# Patient Record
Sex: FEMALE | Race: WHITE | NOT HISPANIC OR LATINO | Employment: UNEMPLOYED | URBAN - METROPOLITAN AREA
[De-identification: names, ages, dates, MRNs, and addresses within clinical notes are randomized per-mention and may not be internally consistent; named-entity substitution may affect disease eponyms.]

---

## 2017-01-17 LAB
EXTERNAL ABO GROUPING: NORMAL
EXTERNAL HEPATITIS B SURFACE ANTIGEN: NEGATIVE
EXTERNAL RH FACTOR: POSITIVE
EXTERNAL RUBELLA QUALITATIVE: NORMAL
EXTERNAL SYPHILIS RPR SCREEN: NORMAL
EXTERNAL URINE DRUG SCREEN: NORMAL
HIV1 AB SPEC QL IA.RAPID: NEGATIVE

## 2017-05-16 LAB — EXTERNAL GTT 1 HOUR: 86

## 2017-07-25 ENCOUNTER — LAB REQUISITION (OUTPATIENT)
Dept: LAB | Facility: HOSPITAL | Age: 32
End: 2017-07-25

## 2017-07-25 DIAGNOSIS — Z34.83 ENCOUNTER FOR SUPERVISION OF OTHER NORMAL PREGNANCY, THIRD TRIMESTER: ICD-10-CM

## 2017-07-25 LAB — EXTERNAL GROUP B STREP ANTIGEN: NORMAL

## 2017-07-25 PROCEDURE — 87081 CULTURE SCREEN ONLY: CPT | Performed by: OBSTETRICS & GYNECOLOGY

## 2017-07-28 LAB — BACTERIA SPEC AEROBE CULT: NORMAL

## 2017-08-13 ENCOUNTER — ANESTHESIA (OUTPATIENT)
Dept: LABOR AND DELIVERY | Facility: HOSPITAL | Age: 32
End: 2017-08-13

## 2017-08-13 ENCOUNTER — ANESTHESIA EVENT (OUTPATIENT)
Dept: LABOR AND DELIVERY | Facility: HOSPITAL | Age: 32
End: 2017-08-13

## 2017-08-13 ENCOUNTER — HOSPITAL ENCOUNTER (INPATIENT)
Facility: HOSPITAL | Age: 32
LOS: 2 days | Discharge: HOME | End: 2017-08-15
Attending: OBSTETRICS & GYNECOLOGY | Admitting: OBSTETRICS & GYNECOLOGY

## 2017-08-13 LAB
ABO GROUP BLD: NORMAL
ABO GROUP BLD: NORMAL
BLD GP AB SCN SERPL QL: NEGATIVE
BLD GP AB SCN SERPL QL: NEGATIVE
DEPRECATED RDW RBC AUTO: 44.7 FL (ref 37–54)
ERYTHROCYTE [DISTWIDTH] IN BLOOD BY AUTOMATED COUNT: 12.9 % (ref 11.3–14.5)
HCT VFR BLD AUTO: 42.2 % (ref 34.5–44)
HGB BLD-MCNC: 14.8 G/DL (ref 11.5–15.5)
MCH RBC QN AUTO: 33.4 PG (ref 27–31)
MCHC RBC AUTO-ENTMCNC: 35.1 G/DL (ref 32–36)
MCV RBC AUTO: 95.3 FL (ref 80–99)
PLATELET # BLD AUTO: 205 10*3/MM3 (ref 150–450)
PMV BLD AUTO: 11.4 FL (ref 6–12)
RBC # BLD AUTO: 4.43 10*6/MM3 (ref 3.89–5.14)
RH BLD: POSITIVE
RH BLD: POSITIVE
WBC NRBC COR # BLD: 13.55 10*3/MM3 (ref 3.5–10.8)

## 2017-08-13 PROCEDURE — 0KQM0ZZ REPAIR PERINEUM MUSCLE, OPEN APPROACH: ICD-10-PCS | Performed by: OBSTETRICS & GYNECOLOGY

## 2017-08-13 PROCEDURE — 25010000002 ROPIVACAINE PER 1 MG: Performed by: ANESTHESIOLOGY

## 2017-08-13 PROCEDURE — 25010000002 FENTANYL CITRATE (PF) 100 MCG/2ML SOLUTION: Performed by: ANESTHESIOLOGY

## 2017-08-13 PROCEDURE — 85027 COMPLETE CBC AUTOMATED: CPT | Performed by: OBSTETRICS & GYNECOLOGY

## 2017-08-13 PROCEDURE — 86900 BLOOD TYPING SEROLOGIC ABO: CPT | Performed by: OBSTETRICS & GYNECOLOGY

## 2017-08-13 PROCEDURE — 86850 RBC ANTIBODY SCREEN: CPT | Performed by: OBSTETRICS & GYNECOLOGY

## 2017-08-13 PROCEDURE — 86901 BLOOD TYPING SEROLOGIC RH(D): CPT | Performed by: OBSTETRICS & GYNECOLOGY

## 2017-08-13 PROCEDURE — C1755 CATHETER, INTRASPINAL: HCPCS | Performed by: ANESTHESIOLOGY

## 2017-08-13 RX ORDER — ONDANSETRON 2 MG/ML
4 INJECTION INTRAMUSCULAR; INTRAVENOUS EVERY 6 HOURS PRN
Status: DISCONTINUED | OUTPATIENT
Start: 2017-08-13 | End: 2017-08-13 | Stop reason: HOSPADM

## 2017-08-13 RX ORDER — PROMETHAZINE HYDROCHLORIDE 12.5 MG/1
12.5 TABLET ORAL EVERY 6 HOURS PRN
Status: DISCONTINUED | OUTPATIENT
Start: 2017-08-13 | End: 2017-08-13 | Stop reason: HOSPADM

## 2017-08-13 RX ORDER — SODIUM CHLORIDE 0.9 % (FLUSH) 0.9 %
1-10 SYRINGE (ML) INJECTION AS NEEDED
Status: DISCONTINUED | OUTPATIENT
Start: 2017-08-13 | End: 2017-08-13 | Stop reason: HOSPADM

## 2017-08-13 RX ORDER — METOCLOPRAMIDE HYDROCHLORIDE 5 MG/ML
10 INJECTION INTRAMUSCULAR; INTRAVENOUS ONCE AS NEEDED
Status: DISCONTINUED | OUTPATIENT
Start: 2017-08-13 | End: 2017-08-13 | Stop reason: HOSPADM

## 2017-08-13 RX ORDER — DIPHENHYDRAMINE HCL 25 MG
25 CAPSULE ORAL NIGHTLY PRN
Status: DISCONTINUED | OUTPATIENT
Start: 2017-08-13 | End: 2017-08-13 | Stop reason: HOSPADM

## 2017-08-13 RX ORDER — IBUPROFEN 600 MG/1
600 TABLET ORAL EVERY 6 HOURS PRN
Status: DISCONTINUED | OUTPATIENT
Start: 2017-08-13 | End: 2017-08-13 | Stop reason: HOSPADM

## 2017-08-13 RX ORDER — PRENATAL WITH FERROUS FUM AND FOLIC ACID 3080; 920; 120; 400; 22; 1.84; 3; 20; 10; 1; 12; 200; 27; 25; 2 [IU]/1; [IU]/1; MG/1; [IU]/1; MG/1; MG/1; MG/1; MG/1; MG/1; MG/1; UG/1; MG/1; MG/1; MG/1; MG/1
1 TABLET ORAL DAILY
COMMUNITY
End: 2020-01-31 | Stop reason: HOSPADM

## 2017-08-13 RX ORDER — ACETAMINOPHEN 325 MG/1
650 TABLET ORAL EVERY 4 HOURS PRN
Status: DISCONTINUED | OUTPATIENT
Start: 2017-08-13 | End: 2017-08-13 | Stop reason: HOSPADM

## 2017-08-13 RX ORDER — TRISODIUM CITRATE DIHYDRATE AND CITRIC ACID MONOHYDRATE 500; 334 MG/5ML; MG/5ML
30 SOLUTION ORAL ONCE
Status: DISCONTINUED | OUTPATIENT
Start: 2017-08-13 | End: 2017-08-13 | Stop reason: HOSPADM

## 2017-08-13 RX ORDER — ZOLPIDEM TARTRATE 5 MG/1
5 TABLET ORAL NIGHTLY PRN
Status: DISCONTINUED | OUTPATIENT
Start: 2017-08-13 | End: 2017-08-15 | Stop reason: HOSPADM

## 2017-08-13 RX ORDER — IBUPROFEN 600 MG/1
600 TABLET ORAL EVERY 6 HOURS PRN
Status: DISCONTINUED | OUTPATIENT
Start: 2017-08-13 | End: 2017-08-15 | Stop reason: HOSPADM

## 2017-08-13 RX ORDER — OXYCODONE HYDROCHLORIDE AND ACETAMINOPHEN 5; 325 MG/1; MG/1
1 TABLET ORAL EVERY 4 HOURS PRN
Status: DISCONTINUED | OUTPATIENT
Start: 2017-08-13 | End: 2017-08-13 | Stop reason: HOSPADM

## 2017-08-13 RX ORDER — ZOLPIDEM TARTRATE 5 MG/1
5 TABLET ORAL NIGHTLY PRN
Status: DISCONTINUED | OUTPATIENT
Start: 2017-08-13 | End: 2017-08-13 | Stop reason: HOSPADM

## 2017-08-13 RX ORDER — PROMETHAZINE HYDROCHLORIDE 12.5 MG/1
12.5 SUPPOSITORY RECTAL EVERY 6 HOURS PRN
Status: DISCONTINUED | OUTPATIENT
Start: 2017-08-13 | End: 2017-08-13 | Stop reason: HOSPADM

## 2017-08-13 RX ORDER — PROMETHAZINE HYDROCHLORIDE 25 MG/ML
12.5 INJECTION, SOLUTION INTRAMUSCULAR; INTRAVENOUS EVERY 6 HOURS PRN
Status: DISCONTINUED | OUTPATIENT
Start: 2017-08-13 | End: 2017-08-13 | Stop reason: HOSPADM

## 2017-08-13 RX ORDER — DIPHENHYDRAMINE HYDROCHLORIDE 50 MG/ML
12.5 INJECTION INTRAMUSCULAR; INTRAVENOUS EVERY 8 HOURS PRN
Status: DISCONTINUED | OUTPATIENT
Start: 2017-08-13 | End: 2017-08-13 | Stop reason: HOSPADM

## 2017-08-13 RX ORDER — SODIUM CHLORIDE, SODIUM LACTATE, POTASSIUM CHLORIDE, CALCIUM CHLORIDE 600; 310; 30; 20 MG/100ML; MG/100ML; MG/100ML; MG/100ML
125 INJECTION, SOLUTION INTRAVENOUS CONTINUOUS
Status: DISCONTINUED | OUTPATIENT
Start: 2017-08-13 | End: 2017-08-13

## 2017-08-13 RX ORDER — OXYCODONE HYDROCHLORIDE AND ACETAMINOPHEN 5; 325 MG/1; MG/1
1 TABLET ORAL EVERY 4 HOURS PRN
Status: DISCONTINUED | OUTPATIENT
Start: 2017-08-13 | End: 2017-08-15 | Stop reason: HOSPADM

## 2017-08-13 RX ORDER — DOCUSATE SODIUM 100 MG/1
100 CAPSULE, LIQUID FILLED ORAL 2 TIMES DAILY
Status: DISCONTINUED | OUTPATIENT
Start: 2017-08-13 | End: 2017-08-15 | Stop reason: HOSPADM

## 2017-08-13 RX ORDER — DIPHENHYDRAMINE HYDROCHLORIDE 50 MG/ML
25 INJECTION INTRAMUSCULAR; INTRAVENOUS NIGHTLY PRN
Status: DISCONTINUED | OUTPATIENT
Start: 2017-08-13 | End: 2017-08-13 | Stop reason: HOSPADM

## 2017-08-13 RX ORDER — BISACODYL 10 MG
10 SUPPOSITORY, RECTAL RECTAL DAILY PRN
Status: DISCONTINUED | OUTPATIENT
Start: 2017-08-14 | End: 2017-08-15 | Stop reason: HOSPADM

## 2017-08-13 RX ORDER — LANOLIN 100 %
OINTMENT (GRAM) TOPICAL AS NEEDED
Status: DISCONTINUED | OUTPATIENT
Start: 2017-08-13 | End: 2017-08-15 | Stop reason: HOSPADM

## 2017-08-13 RX ORDER — FENTANYL CITRATE 50 UG/ML
INJECTION, SOLUTION INTRAMUSCULAR; INTRAVENOUS AS NEEDED
Status: DISCONTINUED | OUTPATIENT
Start: 2017-08-13 | End: 2017-08-13 | Stop reason: SURG

## 2017-08-13 RX ORDER — OXYCODONE HYDROCHLORIDE AND ACETAMINOPHEN 5; 325 MG/1; MG/1
2 TABLET ORAL EVERY 4 HOURS PRN
Status: DISCONTINUED | OUTPATIENT
Start: 2017-08-13 | End: 2017-08-13 | Stop reason: HOSPADM

## 2017-08-13 RX ORDER — MORPHINE SULFATE 4 MG/ML
2 INJECTION, SOLUTION INTRAMUSCULAR; INTRAVENOUS
Status: DISCONTINUED | OUTPATIENT
Start: 2017-08-13 | End: 2017-08-13 | Stop reason: HOSPADM

## 2017-08-13 RX ORDER — ROPIVACAINE HYDROCHLORIDE 5 MG/ML
INJECTION, SOLUTION EPIDURAL; INFILTRATION; PERINEURAL AS NEEDED
Status: DISCONTINUED | OUTPATIENT
Start: 2017-08-13 | End: 2017-08-13 | Stop reason: SURG

## 2017-08-13 RX ORDER — LIDOCAINE HYDROCHLORIDE 10 MG/ML
5 INJECTION, SOLUTION INFILTRATION; PERINEURAL AS NEEDED
Status: DISCONTINUED | OUTPATIENT
Start: 2017-08-13 | End: 2017-08-13 | Stop reason: HOSPADM

## 2017-08-13 RX ORDER — EPHEDRINE SULFATE/0.9% NACL/PF 50 MG/10ML
5 SYRINGE (ML) INTRAVENOUS
Status: DISCONTINUED | OUTPATIENT
Start: 2017-08-13 | End: 2017-08-13 | Stop reason: HOSPADM

## 2017-08-13 RX ORDER — FAMOTIDINE 10 MG/ML
20 INJECTION, SOLUTION INTRAVENOUS ONCE AS NEEDED
Status: DISCONTINUED | OUTPATIENT
Start: 2017-08-13 | End: 2017-08-13 | Stop reason: HOSPADM

## 2017-08-13 RX ORDER — OXYTOCIN/RINGER'S LACTATE 20/1000 ML
999 PLASTIC BAG, INJECTION (ML) INTRAVENOUS ONCE
Status: COMPLETED | OUTPATIENT
Start: 2017-08-13 | End: 2017-08-13

## 2017-08-13 RX ORDER — OXYTOCIN/RINGER'S LACTATE 20/1000 ML
125 PLASTIC BAG, INJECTION (ML) INTRAVENOUS ONCE
Status: COMPLETED | OUTPATIENT
Start: 2017-08-13 | End: 2017-08-13

## 2017-08-13 RX ORDER — PROMETHAZINE HYDROCHLORIDE 25 MG/1
25 TABLET ORAL EVERY 6 HOURS PRN
Status: DISCONTINUED | OUTPATIENT
Start: 2017-08-13 | End: 2017-08-15 | Stop reason: HOSPADM

## 2017-08-13 RX ADMIN — SODIUM CHLORIDE, POTASSIUM CHLORIDE, SODIUM LACTATE AND CALCIUM CHLORIDE 1000 ML: 600; 310; 30; 20 INJECTION, SOLUTION INTRAVENOUS at 05:30

## 2017-08-13 RX ADMIN — Medication 125 ML/HR: at 13:16

## 2017-08-13 RX ADMIN — ROPIVACAINE HYDROCHLORIDE 10 ML: 5 INJECTION, SOLUTION EPIDURAL; INFILTRATION; PERINEURAL at 06:00

## 2017-08-13 RX ADMIN — ROPIVACAINE HYDROCHLORIDE 16 ML/HR: 5 INJECTION, SOLUTION EPIDURAL; INFILTRATION; PERINEURAL at 06:00

## 2017-08-13 RX ADMIN — DOCUSATE SODIUM 100 MG: 100 CAPSULE, LIQUID FILLED ORAL at 16:26

## 2017-08-13 RX ADMIN — SODIUM CHLORIDE, POTASSIUM CHLORIDE, SODIUM LACTATE AND CALCIUM CHLORIDE 125 ML/HR: 600; 310; 30; 20 INJECTION, SOLUTION INTRAVENOUS at 07:27

## 2017-08-13 RX ADMIN — OXYCODONE AND ACETAMINOPHEN 1 TABLET: 5; 325 TABLET ORAL at 20:07

## 2017-08-13 RX ADMIN — Medication 999 ML/HR: at 12:15

## 2017-08-13 RX ADMIN — IBUPROFEN 600 MG: 600 TABLET ORAL at 16:26

## 2017-08-13 RX ADMIN — FENTANYL CITRATE 100 MCG: 50 INJECTION, SOLUTION INTRAMUSCULAR; INTRAVENOUS at 06:00

## 2017-08-13 NOTE — L&D DELIVERY NOTE
2017    Patient:Luzma Parks    MR#:[id number]    Vaginal Delivery Note  32 y.o. yo female  at 39w1d    Patient Active Problem List   Diagnosis   • Labor without complication       Delivery     Delivery: Vaginal, Spontaneous Delivery     YOB: 2016    Time of Birth: 12:08 PM      Anesthesia: Epidural     Delivering clinician: Claudette Pool    Forceps?   No   Vacuum? No    Shoulder dystocia present: No          Infant    Findings: male  infant     Infant observations: Weight: 7 lb 15.7 oz (3.62 kg)     Observations/Comments:         Apgars: 8   @ 1 minute /    9   @ 5 minutes         Placenta, Cord, and Fluid    Amnionic Fluid: clear   Placenta delivered  Spontaneous  at        Cord: 3 vessels  present.  Uterus explored and empty   Nuchal Cord?  no   Cord blood obtained: Yes    Cord gases obtained:  No    Cord gas results: Pending         Repair    Episiotomy: No   Lacerations: Yes  Laceration Information  Laceration Repaired?   Perineal: 2nd  Yes    Periurethral:         Labial: right  Yes    Sulcus: left  Yes    Vaginal: Yes  Yes    Cervical: No             Estimated Blood Loss:    400  mls.   Suture used for repair: 3-0 Vicryl and 2-0 Vicryl.   Rectal mucosa and sphincter intact and reinforced.  Hematoma forming in left sulcus secured and stable.     Complications  none    Disposition  Mother to Mother Baby/Postpartum  in stable condition currently.  Baby to remains with mom  in stable condition currently.                Claudette Pool MD  17  12:46 PM

## 2017-08-13 NOTE — PLAN OF CARE
Problem: Patient Care Overview (Adult)  Goal: Adult Individualization and Mutuality  Outcome: Ongoing (interventions implemented as appropriate)  Goal: Discharge Needs Assessment  Outcome: Ongoing (interventions implemented as appropriate)    08/13/17 0812   Discharge Needs Assessment   Concerns To Be Addressed no discharge needs identified         Problem: Labor (Cervical Ripen, Induct, Augment) (Adult,Obstetrics,Pediatric)  Goal: Signs and Symptoms of Listed Potential Problems Will be Absent or Manageable (Labor)  Outcome: Outcome(s) achieved Date Met:  08/13/17 08/13/17 0812   Labor (Cervical Ripen, Induct, Augment)   Problems Assessed (Labor) all   Problems Present (Labor) none

## 2017-08-13 NOTE — ANESTHESIA PROCEDURE NOTES
Labor Epidural    Patient location during procedure: OB  Start Time: 8/13/2017 5:52 AM  Performed By  Anesthesiologist: SEVEN FAROOQ  Preanesthetic Checklist  Completed: patient identified, site marked, surgical consent, pre-op evaluation, timeout performed, risks and benefits discussed and monitors and equipment checked  Prep:  Pt Position:sitting  Sterile Tech:cap, gloves, mask and sterile barrier  Prep:alcohol swabs  Monitoring:blood pressure monitoring  Epidural Block Procedure:  Approach:midline  Guidance:landmark technique  Location:L3-L4  Needle Type:Tuohy  Needle Gauge:17 G  Loss of Resistance Medium: air  Loss of Resistance: 5cm  Cath Depth at skin:10 cm  Paresthesia: none  Aspiration:negative  Test Dose:negative  Number of Attempts: 1  Post Assessment:  Dressing:occlusive dressing applied and secured with tape  Pt Tolerance:patient tolerated the procedure well with no apparent complications  Complications:no

## 2017-08-13 NOTE — H&P
"History and Physical  [facility]    Chief Complaint   Patient presents with   • rule out labor       Patient Active Problem List   Diagnosis   • Labor without complication       Luzma Parks is a 32 y.o. year old  with an Estimated Date of Delivery: 17 currently at 39w1d presenting with labor.    Prenatal care has been with Dr. Pool.  It has been significant for uncomplicated.      The following portions of the patient's history were reviewed and updated as appropriate:vital signs, allergies, current medications, past medical history, past social history, past surgical history and problem list.    Review of Systems  Pertinent items are noted in HPI.     Objective     /68 (BP Location: Left arm, Patient Position: Lying)  Pulse 67  Temp 98 °F (36.7 °C) (Oral)   Resp 16  Ht 65\" (165.1 cm)  Wt 170 lb (77.1 kg)  BMI 28.29 kg/m2    Physical Exam    General:  well developed; well nourished  no acute distress           Abdomen: soft, non-tender; no masses       FHT's: reactive and category 1   Cervix: 3/80 on admit   [place]: Contraction are regular     Lab Review   Labs: No data reviewed   Lab Results (last 24 hours)     Procedure Component Value Units Date/Time    CBC (No Diff) [[id number]]  (Abnormal) Collected:  17 0540    Specimen:  Blood Updated:  17 06     WBC 13.55 (H) 10*3/mm3      RBC 4.43 10*6/mm3      Hemoglobin 14.8 g/dL      Hematocrit 42.2 %      MCV 95.3 fL      MCH 33.4 (H) pg      MCHC 35.1 g/dL      RDW 12.9 %      RDW-SD 44.7 fl      MPV 11.4 fL      Platelets 205 10*3/mm3     Group B Streptococcus Culture [[id number]] Resulted:  17     Specimen:  Swab from Vaginal/Rectum Updated:  17     External Strep Group B Ag NEG    Urine Drug Screen [[id number]] Resulted:  17     Specimen:  Urine from Urine, Clean Catch Updated:  17     External Urine Drug Screen neg    GTT 1 Hour [[id number]] Resulted:  17     Specimen:  Blood " Updated:  08/13/17 0722     External GTT 1 Hour 86    Hepatitis B Surface Antigen [[id number]] Resulted:  01/17/17     Specimen:  Blood Updated:  08/13/17 0722     External Hepatitis B Surface Ag Negative    RPR [[id number]] Resulted:  01/17/17     Specimen:  Blood Updated:  08/13/17 0722     External RPR Non-Reactive    Rubella Antibody, IgG [[id number]] Resulted:  01/17/17     Specimen:  Blood Updated:  08/13/17 0722     External Rubella Qual Immune    HIV-1 Antibody, EIA [[id number]] Resulted:  01/17/17     Specimen:  Blood Updated:  08/13/17 0722     External HIV-1 Antibody Negative            Assessment/Plan     ASSESSMENT  1. IUP at 39w1d  2. GBS neg  3. Active labor     PLAN  1. Admit for delivery         Claudette Pool MD  8/13/20179:45 AM

## 2017-08-14 LAB
BASOPHILS # BLD AUTO: 0.03 10*3/MM3 (ref 0–0.2)
BASOPHILS NFR BLD AUTO: 0.2 % (ref 0–1)
DEPRECATED RDW RBC AUTO: 46.4 FL (ref 37–54)
EOSINOPHIL # BLD AUTO: 0.21 10*3/MM3 (ref 0–0.3)
EOSINOPHIL NFR BLD AUTO: 1.4 % (ref 0–3)
ERYTHROCYTE [DISTWIDTH] IN BLOOD BY AUTOMATED COUNT: 13.4 % (ref 11.3–14.5)
HCT VFR BLD AUTO: 36.6 % (ref 34.5–44)
HGB BLD-MCNC: 12.4 G/DL (ref 11.5–15.5)
IMM GRANULOCYTES # BLD: 0.1 10*3/MM3 (ref 0–0.03)
IMM GRANULOCYTES NFR BLD: 0.7 % (ref 0–0.6)
LYMPHOCYTES # BLD AUTO: 2.37 10*3/MM3 (ref 0.6–4.8)
LYMPHOCYTES NFR BLD AUTO: 16.1 % (ref 24–44)
MCH RBC QN AUTO: 32.6 PG (ref 27–31)
MCHC RBC AUTO-ENTMCNC: 33.9 G/DL (ref 32–36)
MCV RBC AUTO: 96.3 FL (ref 80–99)
MONOCYTES # BLD AUTO: 0.51 10*3/MM3 (ref 0–1)
MONOCYTES NFR BLD AUTO: 3.5 % (ref 0–12)
NEUTROPHILS # BLD AUTO: 11.48 10*3/MM3 (ref 1.5–8.3)
NEUTROPHILS NFR BLD AUTO: 78.1 % (ref 41–71)
PLATELET # BLD AUTO: 183 10*3/MM3 (ref 150–450)
PMV BLD AUTO: 10.8 FL (ref 6–12)
RBC # BLD AUTO: 3.8 10*6/MM3 (ref 3.89–5.14)
WBC NRBC COR # BLD: 14.7 10*3/MM3 (ref 3.5–10.8)

## 2017-08-14 PROCEDURE — 85025 COMPLETE CBC W/AUTO DIFF WBC: CPT | Performed by: OBSTETRICS & GYNECOLOGY

## 2017-08-14 RX ADMIN — IBUPROFEN 600 MG: 600 TABLET ORAL at 12:30

## 2017-08-14 RX ADMIN — OXYCODONE AND ACETAMINOPHEN 1 TABLET: 5; 325 TABLET ORAL at 06:30

## 2017-08-14 RX ADMIN — DOCUSATE SODIUM 100 MG: 100 CAPSULE, LIQUID FILLED ORAL at 17:31

## 2017-08-14 RX ADMIN — DOCUSATE SODIUM 100 MG: 100 CAPSULE, LIQUID FILLED ORAL at 09:05

## 2017-08-14 RX ADMIN — IBUPROFEN 600 MG: 600 TABLET ORAL at 06:30

## 2017-08-14 RX ADMIN — IBUPROFEN 600 MG: 600 TABLET ORAL at 00:03

## 2017-08-14 RX ADMIN — OXYCODONE AND ACETAMINOPHEN 1 TABLET: 5; 325 TABLET ORAL at 19:26

## 2017-08-14 RX ADMIN — OXYCODONE AND ACETAMINOPHEN 1 TABLET: 5; 325 TABLET ORAL at 12:30

## 2017-08-14 RX ADMIN — OXYCODONE AND ACETAMINOPHEN 1 TABLET: 5; 325 TABLET ORAL at 00:03

## 2017-08-14 RX ADMIN — IBUPROFEN 600 MG: 600 TABLET ORAL at 19:26

## 2017-08-14 NOTE — ANESTHESIA POSTPROCEDURE EVALUATION
Patient: Luzma Parks    Procedure Summary     Date Anesthesia Start Anesthesia Stop Room / Location    08/13/17 0552 1216        Procedure Diagnosis Scheduled Providers Provider    LABOR ANALGESIA No diagnosis on file.  Latrell Ware MD          Anesthesia Type: epidural  Last vitals  BP   133/61 (08/14/17 0734)    Temp   98.6 °F (37 °C) (08/14/17 0734)    Pulse   93 (08/14/17 0734)   Resp   16 (08/14/17 0734)    SpO2          Post Anesthesia Care and Evaluation    Patient location during evaluation: bedside  Patient participation: complete - patient participated  Level of consciousness: awake and alert  Pain management: adequate  Airway patency: patent  Anesthetic complications: No anesthetic complications    Cardiovascular status: acceptable  Respiratory status: acceptable  Hydration status: acceptable  Post Neuraxial Block status: Motor and sensory function returned to baseline and No signs or symptoms of PDPH

## 2017-08-14 NOTE — PLAN OF CARE
Problem: Patient Care Overview (Adult)  Goal: Plan of Care Review  Outcome: Ongoing (interventions implemented as appropriate)    08/14/17 6896   Coping/Psychosocial Response Interventions   Plan Of Care Reviewed With patient   Patient Care Overview   Progress improving

## 2017-08-14 NOTE — LACTATION NOTE
This note was copied from a baby's chart.     08/14/17 0860   Maternal Information   Date of Referral 08/14/17   Person Making Referral (courtesy)   Infant Reason for Referral (spitty, not nursed yet)   Maternal Infant Feeding   Maternal Emotional State anxious   Previous Breastfeeding History no   Feeding Infant   Stress Cues emesis;gagging   Equipment Type/Education   Breast Pump Type double electric, personal

## 2017-08-14 NOTE — PLAN OF CARE
Problem: Breastfeeding (Adult,NICU,[place],Obstetrics,Pediatric)  Goal: Signs and Symptoms of Listed Potential Problems Will be Absent or Manageable (Breastfeeding)  Outcome: Ongoing (interventions implemented as appropriate)

## 2017-08-14 NOTE — PLAN OF CARE
Problem: Patient Care Overview (Adult)  Goal: Plan of Care Review  Outcome: Ongoing (interventions implemented as appropriate)    Problem: Breastfeeding (Adult,NICU,[place],Obstetrics,Pediatric)  Goal: Signs and Symptoms of Listed Potential Problems Will be Absent or Manageable (Breastfeeding)  Outcome: Ongoing (interventions implemented as appropriate)

## 2017-08-14 NOTE — PROGRESS NOTES
8/14/2017  PPD #1    Subjective   Luzma feels well.  Patient describes her lochia less than menses.  Pain is well controlled       Objective   Temp: Temp:  [97.9 °F (36.6 °C)-98.7 °F (37.1 °C)] 98.6 °F (37 °C) Temp src: Oral   BP: BP: ()/(52-72) 133/61        Pulse: Heart Rate:  [71-96] 93  RR: Resp:  [16-20] 16    General:  No acute distress   Abdomen: Fundus firm and beneath umbilicus   Pelvis: deferred     Lab Results   Component Value Date    WBC 14.70 (H) 08/14/2017    HGB 12.4 08/14/2017    HCT 36.6 08/14/2017    MCV 96.3 08/14/2017     08/14/2017    HEPBSAG Negative 01/17/2017       Assessment  1. PPD# 1 after vaginal delivery    Plan  1. Routine postpartum care.  2. Desires circumcision.      This note has been electronically signed.    Hilda Mejia, APRN  August 14, 2017

## 2017-08-15 VITALS
HEIGHT: 65 IN | HEART RATE: 84 BPM | RESPIRATION RATE: 16 BRPM | SYSTOLIC BLOOD PRESSURE: 103 MMHG | WEIGHT: 170 LBS | TEMPERATURE: 98.3 F | DIASTOLIC BLOOD PRESSURE: 61 MMHG | BODY MASS INDEX: 28.32 KG/M2

## 2017-08-15 RX ORDER — OXYCODONE HYDROCHLORIDE AND ACETAMINOPHEN 5; 325 MG/1; MG/1
1 TABLET ORAL EVERY 4 HOURS PRN
Qty: 10 TABLET | Refills: 0 | Status: SHIPPED | OUTPATIENT
Start: 2017-08-15 | End: 2017-08-18

## 2017-08-15 RX ORDER — IBUPROFEN 600 MG/1
600 TABLET ORAL EVERY 6 HOURS PRN
Qty: 30 TABLET | Refills: 0 | Status: SHIPPED | OUTPATIENT
Start: 2017-08-15 | End: 2019-12-12 | Stop reason: HOSPADM

## 2017-08-15 RX ADMIN — OXYCODONE AND ACETAMINOPHEN 1 TABLET: 5; 325 TABLET ORAL at 08:37

## 2017-08-15 RX ADMIN — OXYCODONE AND ACETAMINOPHEN 1 TABLET: 5; 325 TABLET ORAL at 00:02

## 2017-08-15 RX ADMIN — DOCUSATE SODIUM 100 MG: 100 CAPSULE, LIQUID FILLED ORAL at 07:56

## 2017-08-15 NOTE — PLAN OF CARE
Problem: Patient Care Overview (Adult)  Goal: Plan of Care Review  Outcome: Outcome(s) achieved Date Met:  08/15/17  Goal: Adult Individualization and Mutuality  Outcome: Outcome(s) achieved Date Met:  08/15/17  Goal: Discharge Needs Assessment  Outcome: Outcome(s) achieved Date Met:  08/15/17    Problem: Breastfeeding (Adult,NICU,[place],Obstetrics,Pediatric)  Goal: Signs and Symptoms of Listed Potential Problems Will be Absent or Manageable (Breastfeeding)  Outcome: Outcome(s) achieved Date Met:  08/15/17    Problem: Postpartum, Vaginal Delivery (Adult)  Goal: Signs and Symptoms of Listed Potential Problems Will be Absent or Manageable (Postpartum, Vaginal Delivery)  Outcome: Outcome(s) achieved Date Met:  08/15/17

## 2017-08-15 NOTE — PLAN OF CARE
Problem: Patient Care Overview (Adult)  Goal: Plan of Care Review  Outcome: Ongoing (interventions implemented as appropriate)    08/15/17 0516   Coping/Psychosocial Response Interventions   Plan Of Care Reviewed With patient   Patient Care Overview   Progress improving       Goal: Adult Individualization and Mutuality  Outcome: Ongoing (interventions implemented as appropriate)  Goal: Discharge Needs Assessment  Outcome: Ongoing (interventions implemented as appropriate)    Problem: Breastfeeding (Adult,NICU,,Obstetrics,Pediatric)  Goal: Signs and Symptoms of Listed Potential Problems Will be Absent or Manageable (Breastfeeding)  Outcome: Ongoing (interventions implemented as appropriate)    08/15/17 0516   Breastfeeding   Problems Assessed (Breastfeeding) all   Problems Present (Breastfeeding) none         Problem: Postpartum, Vaginal Delivery (Adult)  Goal: Signs and Symptoms of Listed Potential Problems Will be Absent or Manageable (Postpartum, Vaginal Delivery)  Outcome: Ongoing (interventions implemented as appropriate)    08/15/17 0516   Postpartum, Vaginal Delivery   Problems Assessed (Postpartum Vaginal Delivery) all   Problems Present (Postpartum Vaginal Delivery) none

## 2017-08-15 NOTE — DISCHARGE SUMMARY
Discharge Summary    Date of Admission: 2017  Date of Discharge:  8/15/2017      Patient: Luzma Parks      MR#:[id number]    Delivery Provider: Claudette Pool       Presenting Problem/History of Present Illness  Labor without complication [O80]     Patient Active Problem List   Diagnosis   (none) - all problems resolved or deleted         Discharge Diagnosis: Vaginal delivery at 39w1d    Procedures:  Vaginal, Spontaneous Delivery     2017    12:08 PM        Discharge Date: 8/15/2017;     Hospital Course  Patient is a 32 y.o. female  at 39w1d status post vaginal delivery without complication. Postpartum the patient did well. She remained afebrile, with vital signs stable. She was ready for discharge on postpartum day 2.     Infant:   male  fetus 7 lb 15.7 oz (3.62 kg)  with Apgar scores of 8  , 9   at five minutes.    Condition on Discharge:  Stable    Vital Signs  Temp:  [98.3 °F (36.8 °C)] 98.3 °F (36.8 °C)  Heart Rate:  [84-86] 84  Resp:  [16] 16  BP: (103-119)/(61-69) 103/61    Lab Results   Component Value Date    WBC 14.70 (H) 2017    HGB 12.4 2017    HCT 36.6 2017    MCV 96.3 2017     2017       Discharge Disposition  Home or Self Care    Discharge Medications   Luzma Parks   Home Medication Instructions RAJAN:[id number]    Printed on:08/15/17 0859   Medication Information                      ibuprofen (ADVIL,MOTRIN) 600 MG tablet  Take 1 tablet by mouth Every 6 (Six) Hours As Needed for Mild Pain (1-3).             oxyCODONE-acetaminophen (PERCOCET) 5-325 MG per tablet  Take 1 tablet by mouth Every 4 (Four) Hours As Needed for Moderate Pain (4-6) for up to 3 days.             Prenatal Vit-Fe Fumarate-FA (PRENATAL 27-1) 27-1 MG tablet tablet  Take 1 tablet by mouth Daily.                 Discharge Diet:     Activity at Discharge:   Activity Instructions     Pelvic Rest                     Follow-up Appointments  No future appointments.  Additional  Instructions for the Follow-ups that You Need to Schedule     Call MD for problems / concerns.    As directed        Discharge Follow-up with Specified Provider    As directed    To:  dr abbasi   Follow Up:  6 Weeks       Follow-Up    As directed    Follow Up Details:  6 weeks with ayleen Hopkins, APRN  08/15/17  8:59 AM  Csd

## 2019-11-15 ENCOUNTER — TRANSCRIBE ORDERS (OUTPATIENT)
Dept: LAB | Facility: HOSPITAL | Age: 34
End: 2019-11-15

## 2019-11-15 ENCOUNTER — LAB (OUTPATIENT)
Dept: LAB | Facility: HOSPITAL | Age: 34
End: 2019-11-15

## 2019-11-15 DIAGNOSIS — Z34.83 PRENATAL CARE, SUBSEQUENT PREGNANCY, THIRD TRIMESTER: Primary | ICD-10-CM

## 2019-11-15 DIAGNOSIS — Z34.83 PRENATAL CARE, SUBSEQUENT PREGNANCY, THIRD TRIMESTER: ICD-10-CM

## 2019-11-15 PROCEDURE — 87081 CULTURE SCREEN ONLY: CPT

## 2019-11-18 LAB — BACTERIA SPEC AEROBE CULT: NORMAL

## 2019-12-10 ENCOUNTER — HOSPITAL ENCOUNTER (INPATIENT)
Facility: HOSPITAL | Age: 34
LOS: 2 days | Discharge: HOME | End: 2019-12-12
Attending: OBSTETRICS & GYNECOLOGY | Admitting: OBSTETRICS & GYNECOLOGY

## 2019-12-10 ENCOUNTER — ANESTHESIA (OUTPATIENT)
Dept: LABOR AND DELIVERY | Facility: HOSPITAL | Age: 34
End: 2019-12-10

## 2019-12-10 ENCOUNTER — ANESTHESIA EVENT (OUTPATIENT)
Dept: LABOR AND DELIVERY | Facility: HOSPITAL | Age: 34
End: 2019-12-10

## 2019-12-10 PROBLEM — Z37.9 NORMAL LABOR: Status: ACTIVE | Noted: 2019-12-10

## 2019-12-10 LAB
ABO GROUP BLD: NORMAL
BLD GP AB SCN SERPL QL: NEGATIVE
DEPRECATED RDW RBC AUTO: 46.6 FL (ref 37–54)
ERYTHROCYTE [DISTWIDTH] IN BLOOD BY AUTOMATED COUNT: 13.1 % (ref 12.3–15.4)
HCT VFR BLD AUTO: 40.9 % (ref 34–46.6)
HGB BLD-MCNC: 13.7 G/DL (ref 12–15.9)
MCH RBC QN AUTO: 32.3 PG (ref 26.6–33)
MCHC RBC AUTO-ENTMCNC: 33.5 G/DL (ref 31.5–35.7)
MCV RBC AUTO: 96.5 FL (ref 79–97)
PLATELET # BLD AUTO: 209 10*3/MM3 (ref 140–450)
PMV BLD AUTO: 9.8 FL (ref 6–12)
RBC # BLD AUTO: 4.24 10*6/MM3 (ref 3.77–5.28)
RH BLD: POSITIVE
T&S EXPIRATION DATE: NORMAL
WBC NRBC COR # BLD: 13.2 10*3/MM3 (ref 3.4–10.8)

## 2019-12-10 PROCEDURE — 86850 RBC ANTIBODY SCREEN: CPT | Performed by: OBSTETRICS & GYNECOLOGY

## 2019-12-10 PROCEDURE — 86901 BLOOD TYPING SEROLOGIC RH(D): CPT | Performed by: OBSTETRICS & GYNECOLOGY

## 2019-12-10 PROCEDURE — 85027 COMPLETE CBC AUTOMATED: CPT | Performed by: OBSTETRICS & GYNECOLOGY

## 2019-12-10 PROCEDURE — 51703 INSERT BLADDER CATH COMPLEX: CPT

## 2019-12-10 PROCEDURE — 25010000002 FENTANYL CITRATE (PF) 100 MCG/2ML SOLUTION: Performed by: ANESTHESIOLOGY

## 2019-12-10 PROCEDURE — 86900 BLOOD TYPING SEROLOGIC ABO: CPT | Performed by: OBSTETRICS & GYNECOLOGY

## 2019-12-10 PROCEDURE — 25010000002 ROPIVACAINE PER 1 MG: Performed by: ANESTHESIOLOGY

## 2019-12-10 PROCEDURE — C1755 CATHETER, INTRASPINAL: HCPCS | Performed by: ANESTHESIOLOGY

## 2019-12-10 PROCEDURE — 59025 FETAL NON-STRESS TEST: CPT

## 2019-12-10 RX ORDER — IBUPROFEN 600 MG/1
600 TABLET ORAL EVERY 6 HOURS PRN
Status: DISCONTINUED | OUTPATIENT
Start: 2019-12-10 | End: 2019-12-12 | Stop reason: HOSPADM

## 2019-12-10 RX ORDER — EPHEDRINE SULFATE/0.9% NACL/PF 25 MG/5 ML
10 SYRINGE (ML) INTRAVENOUS
Status: DISCONTINUED | OUTPATIENT
Start: 2019-12-10 | End: 2019-12-10 | Stop reason: HOSPADM

## 2019-12-10 RX ORDER — ONDANSETRON 2 MG/ML
4 INJECTION INTRAMUSCULAR; INTRAVENOUS ONCE AS NEEDED
Status: DISCONTINUED | OUTPATIENT
Start: 2019-12-10 | End: 2019-12-10 | Stop reason: HOSPADM

## 2019-12-10 RX ORDER — LANOLIN
CREAM (ML) TOPICAL AS NEEDED
Status: DISCONTINUED | OUTPATIENT
Start: 2019-12-10 | End: 2019-12-12 | Stop reason: HOSPADM

## 2019-12-10 RX ORDER — OXYTOCIN-SODIUM CHLORIDE 0.9% IV SOLN 30 UNIT/500ML 30-0.9/5 UT/ML-%
650 SOLUTION INTRAVENOUS ONCE
Status: CANCELLED | OUTPATIENT
Start: 2019-12-10 | End: 2019-12-10

## 2019-12-10 RX ORDER — LIDOCAINE HYDROCHLORIDE 10 MG/ML
5 INJECTION, SOLUTION EPIDURAL; INFILTRATION; INTRACAUDAL; PERINEURAL AS NEEDED
Status: DISCONTINUED | OUTPATIENT
Start: 2019-12-10 | End: 2019-12-10 | Stop reason: HOSPADM

## 2019-12-10 RX ORDER — FENTANYL CITRATE 50 UG/ML
INJECTION, SOLUTION INTRAMUSCULAR; INTRAVENOUS AS NEEDED
Status: DISCONTINUED | OUTPATIENT
Start: 2019-12-10 | End: 2019-12-10 | Stop reason: SURG

## 2019-12-10 RX ORDER — ZOLPIDEM TARTRATE 5 MG/1
5 TABLET ORAL NIGHTLY PRN
Status: DISCONTINUED | OUTPATIENT
Start: 2019-12-10 | End: 2019-12-12 | Stop reason: HOSPADM

## 2019-12-10 RX ORDER — TRISODIUM CITRATE DIHYDRATE AND CITRIC ACID MONOHYDRATE 500; 334 MG/5ML; MG/5ML
30 SOLUTION ORAL ONCE
Status: DISCONTINUED | OUTPATIENT
Start: 2019-12-10 | End: 2019-12-10 | Stop reason: HOSPADM

## 2019-12-10 RX ORDER — SODIUM CHLORIDE 0.9 % (FLUSH) 0.9 %
10 SYRINGE (ML) INJECTION AS NEEDED
Status: DISCONTINUED | OUTPATIENT
Start: 2019-12-10 | End: 2019-12-10 | Stop reason: HOSPADM

## 2019-12-10 RX ORDER — DOCUSATE SODIUM 100 MG/1
100 CAPSULE, LIQUID FILLED ORAL 2 TIMES DAILY PRN
Status: DISCONTINUED | OUTPATIENT
Start: 2019-12-10 | End: 2019-12-12 | Stop reason: HOSPADM

## 2019-12-10 RX ORDER — PROMETHAZINE HYDROCHLORIDE 25 MG/1
25 TABLET ORAL EVERY 6 HOURS PRN
Status: DISCONTINUED | OUTPATIENT
Start: 2019-12-10 | End: 2019-12-12 | Stop reason: HOSPADM

## 2019-12-10 RX ORDER — BISACODYL 10 MG
10 SUPPOSITORY, RECTAL RECTAL DAILY PRN
Status: DISCONTINUED | OUTPATIENT
Start: 2019-12-11 | End: 2019-12-12 | Stop reason: HOSPADM

## 2019-12-10 RX ORDER — METHYLERGONOVINE MALEATE 0.2 MG/ML
200 INJECTION INTRAVENOUS ONCE AS NEEDED
Status: CANCELLED | OUTPATIENT
Start: 2019-12-10

## 2019-12-10 RX ORDER — ROPIVACAINE HYDROCHLORIDE 2 MG/ML
15 INJECTION, SOLUTION EPIDURAL; INFILTRATION; PERINEURAL CONTINUOUS
Status: DISCONTINUED | OUTPATIENT
Start: 2019-12-10 | End: 2019-12-12 | Stop reason: HOSPADM

## 2019-12-10 RX ORDER — PROMETHAZINE HYDROCHLORIDE 12.5 MG/1
12.5 SUPPOSITORY RECTAL EVERY 6 HOURS PRN
Status: DISCONTINUED | OUTPATIENT
Start: 2019-12-10 | End: 2019-12-12 | Stop reason: HOSPADM

## 2019-12-10 RX ORDER — OXYCODONE HYDROCHLORIDE AND ACETAMINOPHEN 5; 325 MG/1; MG/1
1 TABLET ORAL EVERY 4 HOURS PRN
Status: DISCONTINUED | OUTPATIENT
Start: 2019-12-10 | End: 2019-12-12 | Stop reason: HOSPADM

## 2019-12-10 RX ORDER — METOCLOPRAMIDE HYDROCHLORIDE 5 MG/ML
10 INJECTION INTRAMUSCULAR; INTRAVENOUS ONCE AS NEEDED
Status: DISCONTINUED | OUTPATIENT
Start: 2019-12-10 | End: 2019-12-10 | Stop reason: HOSPADM

## 2019-12-10 RX ORDER — ACETAMINOPHEN 325 MG/1
650 TABLET ORAL EVERY 4 HOURS PRN
Status: DISCONTINUED | OUTPATIENT
Start: 2019-12-10 | End: 2019-12-12 | Stop reason: HOSPADM

## 2019-12-10 RX ORDER — OXYTOCIN-SODIUM CHLORIDE 0.9% IV SOLN 30 UNIT/500ML 30-0.9/5 UT/ML-%
85 SOLUTION INTRAVENOUS ONCE
Status: CANCELLED | OUTPATIENT
Start: 2019-12-10 | End: 2019-12-10

## 2019-12-10 RX ORDER — PROMETHAZINE HYDROCHLORIDE 25 MG/ML
12.5 INJECTION, SOLUTION INTRAMUSCULAR; INTRAVENOUS EVERY 6 HOURS PRN
Status: DISCONTINUED | OUTPATIENT
Start: 2019-12-10 | End: 2019-12-12 | Stop reason: HOSPADM

## 2019-12-10 RX ORDER — DIPHENHYDRAMINE HYDROCHLORIDE 50 MG/ML
12.5 INJECTION INTRAMUSCULAR; INTRAVENOUS EVERY 8 HOURS PRN
Status: DISCONTINUED | OUTPATIENT
Start: 2019-12-10 | End: 2019-12-10 | Stop reason: HOSPADM

## 2019-12-10 RX ORDER — SODIUM CHLORIDE 0.9 % (FLUSH) 0.9 %
3 SYRINGE (ML) INJECTION EVERY 12 HOURS SCHEDULED
Status: DISCONTINUED | OUTPATIENT
Start: 2019-12-10 | End: 2019-12-10 | Stop reason: HOSPADM

## 2019-12-10 RX ORDER — SODIUM CHLORIDE, SODIUM LACTATE, POTASSIUM CHLORIDE, CALCIUM CHLORIDE 600; 310; 30; 20 MG/100ML; MG/100ML; MG/100ML; MG/100ML
125 INJECTION, SOLUTION INTRAVENOUS CONTINUOUS
Status: DISCONTINUED | OUTPATIENT
Start: 2019-12-10 | End: 2019-12-12 | Stop reason: HOSPADM

## 2019-12-10 RX ORDER — HYDROCODONE BITARTRATE AND ACETAMINOPHEN 5; 325 MG/1; MG/1
1 TABLET ORAL EVERY 4 HOURS PRN
Status: DISCONTINUED | OUTPATIENT
Start: 2019-12-10 | End: 2019-12-12 | Stop reason: HOSPADM

## 2019-12-10 RX ORDER — CARBOPROST TROMETHAMINE 250 UG/ML
250 INJECTION, SOLUTION INTRAMUSCULAR AS NEEDED
Status: CANCELLED | OUTPATIENT
Start: 2019-12-10

## 2019-12-10 RX ORDER — MAGNESIUM CARB/ALUMINUM HYDROX 105-160MG
30 TABLET,CHEWABLE ORAL ONCE
Status: DISCONTINUED | OUTPATIENT
Start: 2019-12-10 | End: 2019-12-10 | Stop reason: HOSPADM

## 2019-12-10 RX ORDER — FAMOTIDINE 10 MG/ML
20 INJECTION, SOLUTION INTRAVENOUS ONCE AS NEEDED
Status: DISCONTINUED | OUTPATIENT
Start: 2019-12-10 | End: 2019-12-10 | Stop reason: HOSPADM

## 2019-12-10 RX ORDER — SODIUM CHLORIDE 0.9 % (FLUSH) 0.9 %
1-10 SYRINGE (ML) INJECTION AS NEEDED
Status: DISCONTINUED | OUTPATIENT
Start: 2019-12-10 | End: 2019-12-12 | Stop reason: HOSPADM

## 2019-12-10 RX ORDER — MISOPROSTOL 200 UG/1
800 TABLET ORAL AS NEEDED
Status: CANCELLED | OUTPATIENT
Start: 2019-12-10

## 2019-12-10 RX ADMIN — DOCUSATE SODIUM 100 MG: 100 CAPSULE, LIQUID FILLED ORAL at 09:09

## 2019-12-10 RX ADMIN — ROPIVACAINE HYDROCHLORIDE 12 ML: 5 INJECTION, SOLUTION EPIDURAL; INFILTRATION; PERINEURAL at 01:56

## 2019-12-10 RX ADMIN — Medication: at 16:00

## 2019-12-10 RX ADMIN — IBUPROFEN 600 MG: 600 TABLET, FILM COATED ORAL at 10:01

## 2019-12-10 RX ADMIN — IBUPROFEN 600 MG: 600 TABLET, FILM COATED ORAL at 16:00

## 2019-12-10 RX ADMIN — Medication: at 20:12

## 2019-12-10 RX ADMIN — ROPIVACAINE HYDROCHLORIDE 2 ML: 5 INJECTION, SOLUTION EPIDURAL; INFILTRATION; PERINEURAL at 01:54

## 2019-12-10 RX ADMIN — ROPIVACAINE HYDROCHLORIDE 3 ML: 5 INJECTION, SOLUTION EPIDURAL; INFILTRATION; PERINEURAL at 01:52

## 2019-12-10 RX ADMIN — SODIUM CHLORIDE, POTASSIUM CHLORIDE, SODIUM LACTATE AND CALCIUM CHLORIDE 125 ML/HR: 600; 310; 30; 20 INJECTION, SOLUTION INTRAVENOUS at 01:39

## 2019-12-10 RX ADMIN — FENTANYL CITRATE 100 MCG: 50 INJECTION, SOLUTION INTRAMUSCULAR; INTRAVENOUS at 02:00

## 2019-12-10 RX ADMIN — WITCH HAZEL 1 PAD: 500 SOLUTION RECTAL; TOPICAL at 16:00

## 2019-12-10 RX ADMIN — SODIUM CHLORIDE, POTASSIUM CHLORIDE, SODIUM LACTATE AND CALCIUM CHLORIDE 125 ML/HR: 600; 310; 30; 20 INJECTION, SOLUTION INTRAVENOUS at 02:35

## 2019-12-10 RX ADMIN — ROPIVACAINE HYDROCHLORIDE 15 ML/HR: 2 INJECTION, SOLUTION EPIDURAL; INFILTRATION at 02:03

## 2019-12-10 RX ADMIN — ACETAMINOPHEN 650 MG: 325 TABLET ORAL at 20:16

## 2019-12-10 RX ADMIN — SODIUM CHLORIDE, POTASSIUM CHLORIDE, SODIUM LACTATE AND CALCIUM CHLORIDE 1000 ML: 600; 310; 30; 20 INJECTION, SOLUTION INTRAVENOUS at 01:12

## 2019-12-10 RX ADMIN — DOCUSATE SODIUM 100 MG: 100 CAPSULE, LIQUID FILLED ORAL at 20:12

## 2019-12-10 NOTE — ANESTHESIA POSTPROCEDURE EVALUATION
Patient: Luzma Parks    Procedure Summary     Date:  12/10/19 Room / Location:      Anesthesia Start:  0145 Anesthesia Stop:  0338    Procedure:  LABOR ANALGESIA Diagnosis:      Scheduled Providers:   Provider:  Irene Hull DO    Anesthesia Type:  epidural ASA Status:  2          Anesthesia Type: epidural    Vitals  Vitals Value Taken Time   /54 12/10/2019 10:30 AM   Temp 97.9 °F (36.6 °C) 12/10/2019 10:30 AM   Pulse 88 12/10/2019 10:30 AM   Resp 18 12/10/2019 10:30 AM   SpO2             Post Anesthesia Care and Evaluation    Patient location during evaluation: bedside  Patient participation: complete - patient participated  Level of consciousness: awake and alert  Pain management: adequate  Airway patency: patent  Anesthetic complications: No anesthetic complications    Cardiovascular status: acceptable  Respiratory status: acceptable  Hydration status: acceptable  Post Neuraxial Block status: Motor and sensory function returned to baseline and No signs or symptoms of PDPH

## 2019-12-10 NOTE — ANESTHESIA PROCEDURE NOTES
Labor Epidural      Patient reassessed immediately prior to procedure    Patient location during procedure: OB  Performed By  Anesthesiologist: Irene Hull DO  Preanesthetic Checklist  Completed: patient identified, surgical consent, pre-op evaluation, timeout performed, IV checked, risks and benefits discussed and monitors and equipment checked  Prep:  Pt Position:sitting  Sterile Tech:cap, gloves, mask and sterile barrier  Prep:DuraPrep  Monitoring:blood pressure monitoring  Epidural Block Procedure:  Approach:midline  Guidance:palpation technique  Location:L3-L4  Needle Type:Tuohy  Needle Gauge:17 G  Loss of Resistance Medium: air  Loss of Resistance: 5cm  Cath Depth at skin:12 cm  Paresthesia: none  Aspiration:negative  Test Dose:negative  Number of Attempts: 1  Post Assessment:  Dressing:occlusive dressing applied and secured with tape  Pt Tolerance:patient tolerated the procedure well with no apparent complications  Complications:no

## 2019-12-10 NOTE — L&D DELIVERY NOTE
[facility]  Vaginal Delivery Note    Delivery     Delivery: Vaginal, Spontaneous     YOB: 2018    Time of Birth:  Gestational Age 3:35 AM   39w5d     Anesthesia: Epidural     Delivering clinician: Jay Rubio    Forceps?   No   Vacuum? No    Shoulder dystocia present: No        Delivery narrative:  Pt went to complete dilation; comfortable with epidural; began pushing with onset of fetal bradycardia to 70s; Plan to proceed with vacuum extraction with [place]; however pt able to push baby out rapidly before vacuum applied. No episiotomy; controlled delivery of head with reduction of nuchal cord.     Infant    Findings: male  infant     Infant observations: Weight: 3605 g (7 lb 15.2 oz)   Length: 20.5  in  Observations/Comments:         Apgars:    @ 1 minute /       @ 5 minutes   Infant Name:      Placenta, Cord, and Fluid    Placenta delivered  Spontaneous  at   12/10/2019  3:38 AM     Cord: 3 vessels  present.   Nuchal Cord?  yes; Number of nuchal loops present:  1    Cord blood obtained:      Cord gases obtained:       Cord gas results: Venous:  No results found for: PHCVEN    Arterial:  No results found for: PHCART     Repair    Episiotomy: None     No    Lacerations: Yes  Laceration Information  Laceration Repaired?   Perineal:         Periurethral:         Labial:         Sulcus:         Vaginal:         Cervical:           Suture used for repair: 2-0 Vicryl, Rapide   Estimated Blood Loss:             Complications  Fetal aba cardia due to nuchal cord.     Disposition  Mother to Mother Baby/Postpartum  in stable condition currently.  Baby to NBN  in stable condition currently.      Jay Rubio MD  12/10/19  3:53 AM

## 2019-12-10 NOTE — ANESTHESIA PREPROCEDURE EVALUATION
Anesthesia Evaluation     Patient summary reviewed and Nursing notes reviewed   NPO Solid Status: > 6 hours  NPO Liquid Status: > 6 hours           Airway   Mallampati: II  TM distance: >3 FB  Neck ROM: full  No difficulty expected  Dental      Pulmonary - negative pulmonary ROS   Cardiovascular - negative cardio ROS        Neuro/Psych- negative ROS  GI/Hepatic/Renal/Endo - negative ROS     Musculoskeletal (-) negative ROS    Abdominal    Substance History - negative use     OB/GYN    (+) Pregnant,         Other - negative ROS                       Anesthesia Plan    ASA 2     epidural       Anesthetic plan, all risks, benefits, and alternatives have been provided, discussed and informed consent has been obtained with: patient.

## 2019-12-10 NOTE — H&P
"History and Physical  [facility]    Chief Complaint   Patient presents with   • Contractions       Patient Active Problem List   Diagnosis   • Normal labor       Luzma Parks is a 34 y.o. year old  with an Estimated Date of Delivery: 19 currently at 39w5d presenting with regular contractions, normal fetal movement and no vaginal bleeding.    Prenatal care has been with Dr. Pool.  It has been significant for no complications; h/o prior 8# . .    No Additional Complaints Reported    The following portions of the patient's history were reviewed and updated as appropriate:vital signs, allergies, current medications, past medical history, past social history, past surgical history and problem list.    Review of Systems  Pertinent items are noted in HPI.     Objective     /49   Pulse 84   Temp 97.9 °F (36.6 °C) (Oral)   Resp 18   Ht 165.1 cm (65\")   Wt 77.1 kg (170 lb)   Breastfeeding Yes   BMI 28.29 kg/m²     Physical Exam    General:  well developed; well nourished           Abdomen: soft, non-tender; no masses  fundus firm and non-tender       FHT's: reactive and category 1   Cervix: 10 cm dilated, 100 effaced, +3 station   [place]: Contraction are reg     Lab Review   Labs: CBC   Lab Results (last 24 hours)     Procedure Component Value Units Date/Time    CBC (No Diff) [[id number]]  (Abnormal) Collected:  12/10/19 0112    Specimen:  Blood Updated:  12/10/19 0129     WBC 13.20 10*3/mm3      RBC 4.24 10*6/mm3      Hemoglobin 13.7 g/dL      Hematocrit 40.9 %      MCV 96.5 fL      MCH 32.3 pg      MCHC 33.5 g/dL      RDW 13.1 %      RDW-SD 46.6 fl      MPV 9.8 fL      Platelets 209 10*3/mm3           Imaging   No data reviewed   Imaging Results (Most Recent)     None        Assessment/Plan     ASSESSMENT  1. IUP at 39w5d  2.   Normal labor  3. Expect     PLAN  1. Admit.          Jay Rubio MD  12/10/97179:59 AM  "

## 2019-12-11 LAB
BASOPHILS # BLD AUTO: 0.04 10*3/MM3 (ref 0–0.2)
BASOPHILS NFR BLD AUTO: 0.4 % (ref 0–1.5)
DEPRECATED RDW RBC AUTO: 48.3 FL (ref 37–54)
EOSINOPHIL # BLD AUTO: 0.15 10*3/MM3 (ref 0–0.4)
EOSINOPHIL NFR BLD AUTO: 1.4 % (ref 0.3–6.2)
ERYTHROCYTE [DISTWIDTH] IN BLOOD BY AUTOMATED COUNT: 13.3 % (ref 12.3–15.4)
HCT VFR BLD AUTO: 34.6 % (ref 34–46.6)
HGB BLD-MCNC: 11.6 G/DL (ref 12–15.9)
IMM GRANULOCYTES # BLD AUTO: 0.18 10*3/MM3 (ref 0–0.05)
IMM GRANULOCYTES NFR BLD AUTO: 1.7 % (ref 0–0.5)
LYMPHOCYTES # BLD AUTO: 1.76 10*3/MM3 (ref 0.7–3.1)
LYMPHOCYTES NFR BLD AUTO: 16.3 % (ref 19.6–45.3)
MCH RBC QN AUTO: 33.2 PG (ref 26.6–33)
MCHC RBC AUTO-ENTMCNC: 33.5 G/DL (ref 31.5–35.7)
MCV RBC AUTO: 99.1 FL (ref 79–97)
MONOCYTES # BLD AUTO: 0.42 10*3/MM3 (ref 0.1–0.9)
MONOCYTES NFR BLD AUTO: 3.9 % (ref 5–12)
NEUTROPHILS # BLD AUTO: 8.23 10*3/MM3 (ref 1.7–7)
NEUTROPHILS NFR BLD AUTO: 76.3 % (ref 42.7–76)
NRBC BLD AUTO-RTO: 0 /100 WBC (ref 0–0.2)
PLATELET # BLD AUTO: 172 10*3/MM3 (ref 140–450)
PMV BLD AUTO: 10.2 FL (ref 6–12)
RBC # BLD AUTO: 3.49 10*6/MM3 (ref 3.77–5.28)
WBC NRBC COR # BLD: 10.78 10*3/MM3 (ref 3.4–10.8)

## 2019-12-11 PROCEDURE — 85025 COMPLETE CBC W/AUTO DIFF WBC: CPT | Performed by: OBSTETRICS & GYNECOLOGY

## 2019-12-11 RX ADMIN — IBUPROFEN 600 MG: 600 TABLET, FILM COATED ORAL at 21:17

## 2019-12-11 RX ADMIN — DOCUSATE SODIUM 100 MG: 100 CAPSULE, LIQUID FILLED ORAL at 07:45

## 2019-12-11 RX ADMIN — IBUPROFEN 600 MG: 600 TABLET, FILM COATED ORAL at 07:45

## 2019-12-11 RX ADMIN — IBUPROFEN 600 MG: 600 TABLET, FILM COATED ORAL at 01:43

## 2019-12-11 NOTE — PROGRESS NOTES
12/11/2019  PPD #1    Subjective   Luzma feels well.  Patient describes her lochia less than menses.  Pain is well controlled       Objective   Temp: Temp:  [97.7 °F (36.5 °C)-98.3 °F (36.8 °C)] 97.8 °F (36.6 °C) Temp src: Oral   BP: BP: (103-113)/(50-58) 103/50        Pulse: Heart Rate:  [79-88] 80  RR: Resp:  [16-18] 16    General:  No acute distress   Abdomen: Fundus firm and beneath umbilicus   Pelvis: deferred     Lab Results   Component Value Date    WBC 10.78 12/11/2019    HGB 11.6 (L) 12/11/2019    HCT 34.6 12/11/2019    MCV 99.1 (H) 12/11/2019     12/11/2019    HEPBSAG Negative 01/17/2017       Assessment  1. PPD# 1 after vaginal delivery   2. Desires circumcision  3. Breastfeeding  4. MBT= AB positive    Plan  1. Routine postpartum care.      This note has been electronically signed.    Hilda Mejia, APRN  December 11, 2019

## 2019-12-11 NOTE — LACTATION NOTE
12/11/19 1111   Maternal Information   Date of Referral 12/11/19   Person Making Referral other (see comments)  (courtesy)   Maternal Assessment   Breast Size Issue none   Breast Density Bilateral:;soft   Nipples Bilateral:;everted   Left Nipple Symptoms redness;tender   Right Nipple Symptoms tender;redness   Maternal Infant Feeding   Maternal Emotional State relaxed;independent   Infant Positioning cradle   Pain with Feeding yes   Pain Location nipples, bilateral   Pain Description soreness   Comfort Measures Before/During Feeding infant position adjusted;latch adjusted   Comfort Measures Following Feeding expressed milk applied;air-drying encouraged   Latch Assistance no   Equipment Type   Breast Pump Type double electric, personal     States nipples are tender bilaterally. Primary RN assisted with better positioning, baby currently in cradle hold and latched to right side with shield. States was given shield r/t soreness. Enc to use it a few times and try and wean off. Discussed possibility of pumping for a few times if needed while nipples heal. Enc expressed milk and air drying post feeds. States pain gets better after the initial latch. Baby probably had bad latch at some point, and tenderness should resolve.

## 2019-12-12 VITALS
WEIGHT: 170 LBS | HEART RATE: 80 BPM | BODY MASS INDEX: 28.32 KG/M2 | HEIGHT: 65 IN | RESPIRATION RATE: 16 BRPM | TEMPERATURE: 97.6 F | DIASTOLIC BLOOD PRESSURE: 59 MMHG | SYSTOLIC BLOOD PRESSURE: 111 MMHG

## 2019-12-12 RX ORDER — IBUPROFEN 600 MG/1
600 TABLET ORAL EVERY 6 HOURS PRN
Qty: 30 TABLET | Refills: 0 | Status: SHIPPED | OUTPATIENT
Start: 2019-12-12 | End: 2022-03-10

## 2019-12-12 NOTE — DISCHARGE SUMMARY
Discharge Summary    Date of Admission: 12/10/2019  Date of Discharge:  2019      Patient: Luzma Parks      MR#:[id number]    Delivery Provider: Jay Rubio     Discharge Surgeon/OB: yrn    Presenting Problem/History of Present Illness  Normal labor [O80, Z37.9]     Patient Active Problem List   Diagnosis   • Spontaneous vaginal delivery         Discharge Diagnosis: Vaginal delivery at 39w5d    Procedures:  Vaginal, Spontaneous     12/10/2019    3:35 AM        Discharge Date: 2019;     Hospital Course  Patient is a 34 y.o. female  at 39w5d status post vaginal delivery without complication. Postpartum the patient did well. She remained afebrile, with vital signs stable. She was ready for discharge on postpartum day 2.     Infant:   male  fetus 3605 g (7 lb 15.2 oz)  with Apgar scores of 6  , 9   at five minutes.    Condition on Discharge:  Stable    Vital Signs  Temp:  [97.6 °F (36.4 °C)-98.3 °F (36.8 °C)] 97.6 °F (36.4 °C)  Heart Rate:  [80-87] 80  Resp:  [16-18] 16  BP: ()/(54-59) 111/59    Lab Results   Component Value Date    WBC 10.78 2019    HGB 11.6 (L) 2019    HCT 34.6 2019    MCV 99.1 (H) 2019     2019       Discharge Disposition  Home or Self Care    Discharge Medications     Discharge Medications      Continue These Medications      Instructions Start Date   ibuprofen 600 MG tablet  Commonly known as:  ADVIL,MOTRIN   600 mg, Oral, Every 6 Hours PRN      Prenatal 27-1 27-1 MG tablet tablet   1 tablet, Oral, Daily             Discharge Diet:     Activity at Discharge:   Activity Instructions     Pelvic Rest            Follow-up Appointments  No future appointments.  Additional Instructions for the Follow-ups that You Need to Schedule     Call MD With Problems / Concerns   As directed      Discharge Follow-up with Specified Provider: Yrn; 6 Weeks   As directed      To:  Yrn    Follow Up:  6 Weeks               Brooklyn Metzger,  APRN  12/12/19  8:46 AM  Csd

## 2019-12-12 NOTE — PLAN OF CARE
Problem: Patient Care Overview  Goal: Plan of Care Review  Outcome: Ongoing (interventions implemented as appropriate)  Flowsheets (Taken 12/12/2019 0556)  Plan of Care Reviewed With: patient  Outcome Summary: vss,lochia and fundus wnl, pain controlled  Goal: Individualization and Mutuality  Outcome: Ongoing (interventions implemented as appropriate)  Flowsheets (Taken 12/12/2019 0556)  Patient Specific Goals (Include Timeframe): pain at a controlled level by discharge  Patient Specific Interventions: vss,fundus and lochia wnl,pain controlled  Patient Specific Preferences: pain controlled with medication  Goal: Discharge Needs Assessment  Outcome: Ongoing (interventions implemented as appropriate)  Flowsheets (Taken 12/12/2019 0556)  Concerns to be Addressed: no discharge needs identified  Readmission Within the Last 30 Days: no previous admission in last 30 days  Goal: Interprofessional Rounds/Family Conf  Outcome: Ongoing (interventions implemented as appropriate)     Problem: Postpartum (Vaginal Delivery) (Adult,Obstetrics,Pediatric)  Goal: Signs and Symptoms of Listed Potential Problems Will be Absent, Minimized or Managed (Postpartum)  Outcome: Ongoing (interventions implemented as appropriate)  Flowsheets (Taken 12/12/2019 0556)  Problems Assessed (Postpartum Vaginal Delivery): all  Problems Present (Postpartum Vag Deliv): none     Problem: Breastfeeding (Adult,Obstetrics,Pediatric)  Goal: Signs and Symptoms of Listed Potential Problems Will be Absent, Minimized or Managed (Breastfeeding)  Outcome: Ongoing (interventions implemented as appropriate)  Flowsheets (Taken 12/10/2019 1155 by Rogelio Werner, APRN)  Problems Assessed (Breastfeeding): pain;ineffective breastfeeding;situational response;skin breakdown

## 2020-01-31 ENCOUNTER — HOSPITAL ENCOUNTER (EMERGENCY)
Facility: HOSPITAL | Age: 35
Discharge: HOME | End: 2020-01-31
Attending: EMERGENCY MEDICINE | Admitting: EMERGENCY MEDICINE

## 2020-01-31 VITALS
DIASTOLIC BLOOD PRESSURE: 68 MMHG | HEART RATE: 97 BPM | OXYGEN SATURATION: 97 % | BODY MASS INDEX: 26.33 KG/M2 | SYSTOLIC BLOOD PRESSURE: 114 MMHG | RESPIRATION RATE: 16 BRPM | HEIGHT: 65 IN | TEMPERATURE: 99.6 F | WEIGHT: 158 LBS

## 2020-01-31 DIAGNOSIS — N61.0 MASTITIS, LEFT, ACUTE: ICD-10-CM

## 2020-01-31 DIAGNOSIS — N64.4 PAIN OF LEFT BREAST: Primary | ICD-10-CM

## 2020-01-31 PROCEDURE — 99283 EMERGENCY DEPT VISIT LOW MDM: CPT

## 2020-01-31 RX ORDER — CEPHALEXIN 250 MG/1
500 CAPSULE ORAL ONCE
Status: COMPLETED | OUTPATIENT
Start: 2020-01-31 | End: 2020-01-31

## 2020-01-31 RX ORDER — NYSTATIN 100000 U/G
OINTMENT TOPICAL 2 TIMES DAILY
COMMUNITY
End: 2021-05-04

## 2020-01-31 RX ORDER — HYDROCODONE BITARTRATE AND ACETAMINOPHEN 5; 325 MG/1; MG/1
1 TABLET ORAL ONCE
Status: COMPLETED | OUTPATIENT
Start: 2020-01-31 | End: 2020-01-31

## 2020-01-31 RX ORDER — CEPHALEXIN 500 MG/1
500 CAPSULE ORAL 4 TIMES DAILY
Qty: 40 CAPSULE | Refills: 0 | Status: SHIPPED | OUTPATIENT
Start: 2020-01-31 | End: 2021-05-04

## 2020-01-31 RX ADMIN — CEPHALEXIN 500 MG: 250 CAPSULE ORAL at 21:45

## 2020-01-31 RX ADMIN — HYDROCODONE BITARTRATE AND ACETAMINOPHEN 1 TABLET: 5; 325 TABLET ORAL at 21:45

## 2020-02-01 NOTE — ED PROVIDER NOTES
Subjective   Luzma Parks is a 35 y.o. female who presents to the ED with complaints of left sided breast pain since this evening. She states that touch makes the pain worse. Additionally, she endorses chills. However, she denies any fever, nausea, or vomiting. She advises that she is currently in the process of stopping breast feeding and is concerned she might have mastitis. She reports that she has been breast feeding for 7 weeks and has stopped nursing, but is still pumping. She advises that she has tried ibuprofen, however, it has not provided her much relief.       History provided by:  Patient  Breast Pain   Location:  Left breast  Quality:  Pain  Severity:  Moderate  Onset quality:  Sudden  Duration:  1 day  Timing:  Constant  Progression:  Unchanged  Chronicity:  New  Context:  The patient is stopping breast feeding, however, she is still pumping.  Relieved by:  Nothing  Worsened by:  Touch  Ineffective treatments:  Ibuprofen  Associated symptoms: no fever, no nausea and no vomiting        Review of Systems   Constitutional: Positive for chills. Negative for fever.   Gastrointestinal: Negative for nausea and vomiting.   Musculoskeletal:        + left breast pain   All other systems reviewed and are negative.      History reviewed. No pertinent past medical history.    Allergies   Allergen Reactions   • Penicillins Rash       Past Surgical History:   Procedure Laterality Date   • WISDOM TOOTH EXTRACTION         History reviewed. No pertinent family history.    Social History     Socioeconomic History   • Marital status:      Spouse name: Not on file   • Number of children: Not on file   • Years of education: Not on file   • Highest education level: Not on file   Tobacco Use   • Smoking status: Never Smoker   Substance and Sexual Activity   • Alcohol use: No   • Drug use: No   • Sexual activity: Defer         Objective   Physical Exam   Constitutional: She is oriented to person, place, and time. She  "appears well-developed and well-nourished. No distress.   HENT:   Head: Normocephalic and atraumatic.   Eyes: Conjunctivae and EOM are normal.   Neck: Normal range of motion.   Cardiovascular: Normal rate and regular rhythm.   Pulmonary/Chest: Effort normal and breath sounds normal. She exhibits tenderness. Right breast exhibits tenderness. Right breast exhibits no nipple discharge. Left breast exhibits tenderness. Left breast exhibits no nipple discharge.       Musculoskeletal: Normal range of motion.   Neurological: She is alert and oriented to person, place, and time.   Skin: Skin is warm and dry.   Nursing note and vitals reviewed.      Procedures         ED Course  ED Course as of Feb 01 0658 Fri Jan 31, 2020 2232 At bedside re-evaluating the patient and updating her on discharge instructions. -IVAG    [NP]      ED Course User Index  [NP] Arina Rios     No results found for this or any previous visit (from the past 24 hour(s)).  Note: In addition to lab results from this visit, the labs listed above may include labs taken at another facility or during a different encounter within the last 24 hours. Please correlate lab times with ED admission and discharge times for further clarification of the services performed during this visit.    No orders to display     Vitals:    01/31/20 2107 01/31/20 2147   BP: 132/71 114/68   BP Location: Left arm    Patient Position: Sitting    Pulse: 105 97   Resp: 16 16   Temp: 99.6 °F (37.6 °C)    TempSrc: Oral    SpO2: 96% 97%   Weight: 71.7 kg (158 lb)    Height: 165.1 cm (65\")      Medications   HYDROcodone-acetaminophen (NORCO) 5-325 MG per tablet 1 tablet (1 tablet Oral Given 1/31/20 2145)   cephalexin (KEFLEX) capsule 500 mg (500 mg Oral Given 1/31/20 2145)     ECG/EMG Results (last 24 hours)     ** No results found for the last 24 hours. **        No orders to display                                                  MDM    Final diagnoses:   Pain of left breast "   Mastitis, left, acute       Documentation assistance provided by khalida Rios.  Information recorded by the scribe was done at my direction and has been verified and validated by me.     Arina iRos  01/31/20 2126       Phylicia Hathaway, APRN  02/01/20 0604

## 2020-02-01 NOTE — DISCHARGE INSTRUCTIONS
Follow up with one of the [facility] Primary Care Providers below to setup primary care. If you need assistance coordinating a primary care appointment with a [facility] Primary Care Provider, please contact the Primary Care Coordinators at [phone] for appointment scheduling.    [facility], Primary Care   [address] , [address]   [place], [place] [address] [phone]    [facility] Internal Medicine & Endocrinology  [address], [address]  [place] [address] [phone]    [facility] Family Medicine  [address], [address]   [place] [address] [phone]    [facility] Primary Care  [address], [address]  [place], [place] [address] [phone]    [facility], Primary Care,   [address], [address]   [place], [place] [address] [phone]    [facility] Primary Care  [address]  [place], [place] [address] [phone]    [facility], Primary Care  [address], [address]  [place], [place] [id number] [phone]    [facility] Internal Medicine & Pediatrics  [address], [address]   [place] [address] [phone]    [facility], Primary Care  [address] [place] [address] [phone]      [facility] Primary Care  [address], [address]   [place] [address] [phone]    [facility] Family Medicine  [address] [place] [address] [phone]

## 2020-12-16 ENCOUNTER — OFFICE VISIT (OUTPATIENT)
Dept: OBSTETRICS AND GYNECOLOGY | Facility: CLINIC | Age: 35
End: 2020-12-16

## 2020-12-16 VITALS
WEIGHT: 143 LBS | HEIGHT: 65 IN | BODY MASS INDEX: 23.82 KG/M2 | SYSTOLIC BLOOD PRESSURE: 122 MMHG | DIASTOLIC BLOOD PRESSURE: 78 MMHG

## 2020-12-16 DIAGNOSIS — N64.4 BREAST PAIN: Primary | ICD-10-CM

## 2020-12-16 PROCEDURE — 99213 OFFICE O/P EST LOW 20 MIN: CPT | Performed by: OBSTETRICS & GYNECOLOGY

## 2020-12-16 NOTE — PROGRESS NOTES
Chief Complaint   Patient presents with   • Breast Problem       Subjective   HPI  Luzma Parks is a 35 y.o. female, , who presents for L breast pain that has been intermittent for the past few months.      She states she has experienced this problem for 2 months.  She describes the severity as intermittent.  She states that the problem is intermittent.  The patient reports additional symptoms as none.      Her last LMP was Patient's last menstrual period was 2020..  Periods are regular every 28-30 days, lasting 5 days.  Dysmenorrhea:mild, occurring throughout menses.  Patient reports problems with: none.  Partner Status: Marital Status: .  New Partners since last visit: no.  Desires STD Screening: no.    Patient c/o intermittent L breast pain for the past few months-states worse when kids are lying against her-describes as 'ache' that is now radiating into her armpit.     Additional OB/GYN History   Current contraception: contraceptive methods: None  Desires to: do not start contraception  Last Pap :   Last Completed Pap Smear       Status Date      PAP SMEAR No completions recorded        History of abnormal Pap smear: no  Last mammogram:   Last Completed Mammogram     Patient has no health maintenance due at this time        Tobacco Usage?: No   OB History        3    Para   2    Term   2            AB   1    Living   2       SAB   1    TAB        Ectopic        Molar        Multiple   0    Live Births   2                Health Maintenance   Topic Date Due   • Annual Gynecologic Pelvic and Breast Exam  1986   • ANNUAL PHYSICAL  1989   • TDAP/TD VACCINES (1 - Tdap) 2005   • HEPATITIS C SCREENING  2017   • PAP SMEAR  2017   • INFLUENZA VACCINE  2020   • Pneumococcal Vaccine 0-64  Aged Out       The additional following portions of the patient's history were reviewed and updated as appropriate: allergies, current medications, past family history,  "past medical history, past social history and past surgical history.    Review of Systems   Constitutional: Negative.    HENT: Negative.    Respiratory: Negative.    Cardiovascular: Negative.    Gastrointestinal: Negative.    Genitourinary: Positive for breast pain.   Musculoskeletal: Negative.    Skin: Negative.    Allergic/Immunologic: Negative.    Neurological: Negative.    Hematological: Negative.    Psychiatric/Behavioral: Negative.        I have reviewed and agree with the HPI, ROS, and historical information as entered above. Claudette Pool MD    Objective   /78   Ht 165.1 cm (65\")   Wt 64.9 kg (143 lb)   LMP 11/22/2020   Breastfeeding No   BMI 23.80 kg/m²     Physical Exam  Vitals signs and nursing note reviewed. Exam conducted with a chaperone present.   Pulmonary:      Effort: No retractions.   Chest:      Chest wall: No mass.      Breasts:         Right: No mass, nipple discharge, skin change or tenderness.         Left: No mass, nipple discharge, skin change or tenderness.         Assessment/Plan         Problem List Items Addressed This Visit     None      Visit Diagnoses     Breast pain    -  Primary    Relevant Orders    Mammo Diagnostic Digital Tomosynthesis Bilateral With CAD            Plan     1. Reviewed potential etiologies for breast pain.  Will proceed with imaging and encouraged reduction in caffeine, tea, chocolate as well as NSAIDS, Vit E.  F/U in 4-6 months for reevaluation and annual.      Claudette Pool MD  12/16/2020    "

## 2021-01-27 ENCOUNTER — HOSPITAL ENCOUNTER (OUTPATIENT)
Dept: ULTRASOUND IMAGING | Facility: HOSPITAL | Age: 36
Discharge: HOME | End: 2021-01-27

## 2021-01-27 ENCOUNTER — HOSPITAL ENCOUNTER (OUTPATIENT)
Dept: MAMMOGRAPHY | Facility: HOSPITAL | Age: 36
Discharge: HOME | End: 2021-01-27

## 2021-01-27 DIAGNOSIS — N64.4 BREAST PAIN: ICD-10-CM

## 2021-01-27 PROCEDURE — 77066 DX MAMMO INCL CAD BI: CPT | Performed by: RADIOLOGY

## 2021-01-27 PROCEDURE — 77066 DX MAMMO INCL CAD BI: CPT

## 2021-01-27 PROCEDURE — 77062 BREAST TOMOSYNTHESIS BI: CPT | Performed by: RADIOLOGY

## 2021-01-27 PROCEDURE — 76641 ULTRASOUND BREAST COMPLETE: CPT | Performed by: RADIOLOGY

## 2021-01-27 PROCEDURE — 76641 ULTRASOUND BREAST COMPLETE: CPT

## 2021-01-27 PROCEDURE — G0279 TOMOSYNTHESIS, MAMMO: HCPCS

## 2021-05-04 ENCOUNTER — OFFICE VISIT (OUTPATIENT)
Dept: OBSTETRICS AND GYNECOLOGY | Facility: CLINIC | Age: 36
End: 2021-05-04

## 2021-05-04 VITALS
BODY MASS INDEX: 23.79 KG/M2 | WEIGHT: 142.8 LBS | SYSTOLIC BLOOD PRESSURE: 96 MMHG | DIASTOLIC BLOOD PRESSURE: 70 MMHG | HEIGHT: 65 IN

## 2021-05-04 DIAGNOSIS — Z01.419 WOMEN'S ANNUAL ROUTINE GYNECOLOGICAL EXAMINATION: Primary | ICD-10-CM

## 2021-05-04 PROCEDURE — 99395 PREV VISIT EST AGE 18-39: CPT | Performed by: NURSE PRACTITIONER

## 2021-05-04 NOTE — PROGRESS NOTES
GYN Annual Exam     CC - Here for annual exam.        [facility]  Luzma Parks is a 36 y.o. female, , who presents for annual well woman exam. Patient's last menstrual period was 2021 (exact date)..  Periods are regular every 25-35 days, lasting 5 days. .  Dysmenorrhea:none.  Patient reports problems with: none.  There were no changes to her medical or surgical history since her last visit.. Partner Status: Marital Status: .  She is sexually active. She has not had new partners since her last STD testing. She does not desire STD testing. .    Additional OB/GYN History   Current contraception: contraceptive methods: None  Desires to: do not start contraception  Last Pap :  2019 normal    History of abnormal Pap smear: no  Family history of uterine, colon, breast, or ovarian cancer: no  Performs monthly Self-Breast Exam: no  Exercises Regularly:yes  Feelings of Anxiety or Depression:no  Tobacco Usage?: no   OB History        3    Para   2    Term   2            AB   1    Living   2       SAB   1    TAB        Ectopic        Molar        Multiple   0    Live Births   2                Health Maintenance   Topic Date Due   • Annual Gynecologic Pelvic and Breast Exam  Never done   • ANNUAL PHYSICAL  Never done   • TDAP/TD VACCINES (1 - Tdap) Never done   • HEPATITIS C SCREENING  Never done   • INFLUENZA VACCINE  2021   • PAP SMEAR  2022   • COVID-19 Vaccine  Completed   • Pneumococcal Vaccine 0-64  Aged Out       The additional following portions of the patient's history were reviewed and updated as appropriate: allergies, current medications, past medical history, past social history, past surgical history and problem list.    Review of Systems   Constitutional: Negative.    HENT: Negative.    Eyes: Negative.    Respiratory: Negative.    Cardiovascular: Negative.    Gastrointestinal: Negative.    Endocrine: Negative.    Genitourinary: Negative.  Negative for urinary  "incontinence.   Musculoskeletal: Negative.    Allergic/Immunologic: Negative.    Neurological: Negative.    Hematological: Negative.    Psychiatric/Behavioral: Negative.      All other systems reviewed and are negative.     I have reviewed and agree with the HPI, ROS, and historical information as entered above. Shanon Hopkins, APRN    Objective   BP 96/70   Ht 165.1 cm (65\")   Wt 64.8 kg (142 lb 12.8 oz)   LMP 04/27/2021 (Exact Date)   BMI 23.76 kg/m²     Physical Exam  Vitals and nursing note reviewed. Exam conducted with a chaperone present.   Constitutional:       Appearance: She is well-developed.   HENT:      Head: Normocephalic and atraumatic.   Neck:      Thyroid: No thyroid mass or thyromegaly.   Pulmonary:      Effort: Pulmonary effort is normal. No retractions.   Chest:      Chest wall: No mass.      Breasts:         Right: Normal. No mass, nipple discharge, skin change or tenderness.         Left: Normal. No mass, nipple discharge, skin change or tenderness.   Abdominal:      Palpations: Abdomen is soft. Abdomen is not rigid. There is no mass.      Tenderness: There is no abdominal tenderness. There is no guarding.      Hernia: No hernia is present. There is no hernia in the left inguinal area.   Genitourinary:     General: Normal vulva.      Labia:         Right: No rash, tenderness or lesion.         Left: No rash, tenderness or lesion.       Vagina: Normal. No vaginal discharge or lesions.      Cervix: Normal.      Uterus: Normal. Not enlarged, not fixed and not tender.       Adnexa: Right adnexa normal and left adnexa normal.        Right: No mass or tenderness.          Left: No mass or tenderness.        Rectum: No external hemorrhoid.   Musculoskeletal:      Cervical back: Normal range of motion. No muscular tenderness.   Skin:     General: Skin is warm and dry.   Neurological:      Mental Status: She is alert and oriented to person, place, and time.   Psychiatric:         Mood and Affect: Mood " normal.         Behavior: Behavior normal.            Assessment and Plan    Problem List Items Addressed This Visit     None      Visit Diagnoses     Women's annual routine gynecological examination    -  Primary    Relevant Orders    Pap IG, HPV-hr          1. GYN annual well woman exam.   2. Reviewed monthly self breast exams.  Instructed to call with lumps, pain, or breast discharge.    3. Reviewed exercise as a preventative health measures.   4. Reccommended Flu Vaccine in Fall of each year.  5. RTC in 1 year or PRN with problems      Shanon Hopkins, APRN  05/04/2021

## 2021-05-11 DIAGNOSIS — Z01.419 WOMEN'S ANNUAL ROUTINE GYNECOLOGICAL EXAMINATION: ICD-10-CM

## 2021-07-06 ENCOUNTER — OFFICE VISIT (OUTPATIENT)
Dept: OBSTETRICS AND GYNECOLOGY | Facility: CLINIC | Age: 36
End: 2021-07-06

## 2021-07-06 VITALS — DIASTOLIC BLOOD PRESSURE: 62 MMHG | BODY MASS INDEX: 23.16 KG/M2 | WEIGHT: 139.2 LBS | SYSTOLIC BLOOD PRESSURE: 98 MMHG

## 2021-07-06 DIAGNOSIS — K59.00 CONSTIPATION, UNSPECIFIED CONSTIPATION TYPE: ICD-10-CM

## 2021-07-06 DIAGNOSIS — R10.32 LLQ PAIN: Primary | ICD-10-CM

## 2021-07-06 LAB
B-HCG UR QL: NEGATIVE
BILIRUB BLD-MCNC: NEGATIVE MG/DL
CLARITY, POC: CLEAR
COLOR UR: YELLOW
GLUCOSE UR STRIP-MCNC: NEGATIVE MG/DL
INTERNAL NEGATIVE CONTROL: NORMAL
INTERNAL POSITIVE CONTROL: NORMAL
KETONES UR QL: NEGATIVE
LEUKOCYTE EST, POC: NEGATIVE
Lab: NORMAL
NITRITE UR-MCNC: NEGATIVE MG/ML
PH UR: 6 [PH] (ref 5–8)
PROT UR STRIP-MCNC: NEGATIVE MG/DL
RBC # UR STRIP: ABNORMAL /UL
SP GR UR: 1.03 (ref 1–1.03)
UROBILINOGEN UR QL: NORMAL

## 2021-07-06 PROCEDURE — 99213 OFFICE O/P EST LOW 20 MIN: CPT | Performed by: NURSE PRACTITIONER

## 2021-07-06 PROCEDURE — 81025 URINE PREGNANCY TEST: CPT | Performed by: NURSE PRACTITIONER

## 2021-07-06 RX ORDER — CEPHALEXIN 500 MG/1
CAPSULE ORAL
COMMUNITY
Start: 2021-06-21 | End: 2022-03-10

## 2021-07-06 NOTE — PROGRESS NOTES
Chief Complaint   Patient presents with   • Pelvic Pain       Subjective   HPI  Luzma Parks is a 36 y.o. female, , who presents for LLQ pain.    She reports that she has been having LLQ pain x 1 month.  She describes it as a constant, dull ache that is worse at night.  It she says that she has had ovarian cyst in the past, and she thinks that it may feels similar to the pain she experienced when those occurred.  Pain did not worsen, nor improve during her period.  Pain does not radiate to any other area, it stays localized to LLQ.      She is not currently using anything for contraception, and has not taken UPT.  She denies dysuria, abnormal vaginal discharge, abnormal bleeding, vaginal odor, dyspareunia, new sexual partners or frequency.        She reports that the only thing she struggles with is increased urinary urge, and has to immediately go as soon as she feels the urge.  She says that this isn't new, and has been occurring since the delivery of her children.     She reports that pain will occasionally worsen with certain position changes, sneezing or lifting.  She denies anything helping to reduce discomfort.  She says that she hasn't tried to take ibuprofen to help with discomfort, as pain has been tolerable.  She only came in to be seen as it has been present for a prolonged period of time.      Her last LMP was Patient's last menstrual period was 2021 (approximate)..  Periods are regular every 25-35 days, lasting 5 days.  Dysmenorrhea:none.  Patient reports problems with: none.  Partner Status: Marital Status: .  New Partners since last visit: no.      Additional OB/GYN History   Current contraception: contraceptive methods: None  Desires to: Discuss contraception  Last Pap : 2021  Last Completed Pap Smear          Ordered - PAP SMEAR (Every 3 Years) Ordered on 2021  SCANNED - PAP SMEAR    2019  Done - neg              History of abnormal Pap  smear: no  Last mammogram:   Last Completed Mammogram     This patient has no relevant Health Maintenance data.        Tobacco Usage?: No   OB History        3    Para   2    Term   2            AB   1    Living   2       SAB   1    TAB        Ectopic        Molar        Multiple   0    Live Births   2                Health Maintenance   Topic Date Due   • ANNUAL PHYSICAL  Never done   • TDAP/TD VACCINES (1 - Tdap) Never done   • HEPATITIS C SCREENING  Never done   • INFLUENZA VACCINE  2021   • Annual Gynecologic Pelvic and Breast Exam  2022   • PAP SMEAR  2024   • COVID-19 Vaccine  Completed   • Pneumococcal Vaccine 0-64  Aged Out       The additional following portions of the patient's history were reviewed and updated as appropriate: allergies, current medications, past family history, past medical history, past social history, past surgical history and problem list.    Review of Systems   Gastrointestinal: Positive for abdominal pain (LLQ) and constipation (mild per pt, normal for her).       I have reviewed and agree with the HPI, ROS, and historical information as entered above. Nikky Teixeira, APRN    Objective   BP 98/62   Wt 63.1 kg (139 lb 3.2 oz)   LMP 2021 (Approximate)   Breastfeeding No   BMI 23.16 kg/m²     Physical Exam  Vitals and nursing note reviewed. Exam conducted with a chaperone present.   Constitutional:       Appearance: Normal appearance.   HENT:      Head: Normocephalic and atraumatic.   Pulmonary:      Effort: Pulmonary effort is normal.   Abdominal:      General: Abdomen is flat.      Palpations: Abdomen is soft.   Genitourinary:     Labia:         Right: No rash, tenderness or lesion.         Left: No rash, tenderness or lesion.       Vagina: Normal. No lesions.      Cervix: No cervical motion tenderness, discharge, lesion or cervical bleeding (brown spotting).      Uterus: Normal. Not enlarged, not fixed and not tender.       Adnexa:          Right: No mass or tenderness.          Left: No mass or tenderness.        Rectum: No external hemorrhoid.      Comments: Chaperone Present  Neurological:      Mental Status: She is alert and oriented to person, place, and time.   Psychiatric:         Behavior: Behavior normal.         Assessment/Plan     Assessment     Problem List Items Addressed This Visit     None      Visit Diagnoses     LLQ pain    -  Primary    Relevant Orders    POC Urinalysis Dipstick (Completed)    POC Pregnancy, Urine (Completed)    Urine Culture - Urine, Urine, Clean Catch    US Non-ob Transvaginal          Plan     1. CCUA trace blood, sent for culture. UPT negative. Exam unremarkable. U/S normal, no sign of left ovarian cyst. Encouraged bowel regimen including miralax once a day and to increase water and fiber intake, and exercise. Pt. Encouraged to see PCP if LLQ pain continues.       LAYA Hoover  07/06/2021

## 2021-07-08 LAB
BACTERIA UR CULT: NO GROWTH
BACTERIA UR CULT: NORMAL

## 2022-03-10 ENCOUNTER — LAB (OUTPATIENT)
Dept: LAB | Facility: HOSPITAL | Age: 37
End: 2022-03-10

## 2022-03-10 ENCOUNTER — OFFICE VISIT (OUTPATIENT)
Dept: INTERNAL MEDICINE | Facility: CLINIC | Age: 37
End: 2022-03-10

## 2022-03-10 VITALS
HEIGHT: 64 IN | BODY MASS INDEX: 23.7 KG/M2 | HEART RATE: 88 BPM | TEMPERATURE: 97.8 F | WEIGHT: 138.8 LBS | DIASTOLIC BLOOD PRESSURE: 60 MMHG | RESPIRATION RATE: 16 BRPM | SYSTOLIC BLOOD PRESSURE: 100 MMHG | OXYGEN SATURATION: 98 %

## 2022-03-10 DIAGNOSIS — R10.32 CHRONIC LLQ PAIN: Primary | ICD-10-CM

## 2022-03-10 DIAGNOSIS — G89.29 CHRONIC LLQ PAIN: Primary | ICD-10-CM

## 2022-03-10 DIAGNOSIS — R10.32 CHRONIC LLQ PAIN: ICD-10-CM

## 2022-03-10 DIAGNOSIS — G89.29 CHRONIC LLQ PAIN: ICD-10-CM

## 2022-03-10 LAB
ALBUMIN SERPL-MCNC: 4.6 G/DL (ref 3.5–5.2)
ALBUMIN/GLOB SERPL: 2.3 G/DL
ALP SERPL-CCNC: 41 U/L (ref 39–117)
ALT SERPL W P-5'-P-CCNC: 16 U/L (ref 1–33)
ANION GAP SERPL CALCULATED.3IONS-SCNC: 9 MMOL/L (ref 5–15)
AST SERPL-CCNC: 17 U/L (ref 1–32)
BACTERIA UR QL AUTO: ABNORMAL /HPF
BILIRUB SERPL-MCNC: 0.7 MG/DL (ref 0–1.2)
BILIRUB UR QL STRIP: NEGATIVE
BUN SERPL-MCNC: 12 MG/DL (ref 6–20)
BUN/CREAT SERPL: 15 (ref 7–25)
CALCIUM SPEC-SCNC: 9.2 MG/DL (ref 8.6–10.5)
CHLORIDE SERPL-SCNC: 104 MMOL/L (ref 98–107)
CLARITY UR: CLEAR
CO2 SERPL-SCNC: 24 MMOL/L (ref 22–29)
COLOR UR: YELLOW
CREAT SERPL-MCNC: 0.8 MG/DL (ref 0.57–1)
DEPRECATED RDW RBC AUTO: 42.7 FL (ref 37–54)
EGFRCR SERPLBLD CKD-EPI 2021: 98.1 ML/MIN/1.73
ERYTHROCYTE [DISTWIDTH] IN BLOOD BY AUTOMATED COUNT: 12.5 % (ref 12.3–15.4)
GLOBULIN UR ELPH-MCNC: 2 GM/DL
GLUCOSE SERPL-MCNC: 91 MG/DL (ref 65–99)
GLUCOSE UR STRIP-MCNC: NEGATIVE MG/DL
HCT VFR BLD AUTO: 41.1 % (ref 34–46.6)
HGB BLD-MCNC: 13.5 G/DL (ref 12–15.9)
HGB UR QL STRIP.AUTO: NEGATIVE
HYALINE CASTS UR QL AUTO: ABNORMAL /LPF
KETONES UR QL STRIP: NEGATIVE
LEUKOCYTE ESTERASE UR QL STRIP.AUTO: ABNORMAL
MCH RBC QN AUTO: 30.3 PG (ref 26.6–33)
MCHC RBC AUTO-ENTMCNC: 32.8 G/DL (ref 31.5–35.7)
MCV RBC AUTO: 92.2 FL (ref 79–97)
NITRITE UR QL STRIP: NEGATIVE
PH UR STRIP.AUTO: 6 [PH] (ref 5–8)
PLATELET # BLD AUTO: 255 10*3/MM3 (ref 140–450)
PMV BLD AUTO: 10.6 FL (ref 6–12)
POTASSIUM SERPL-SCNC: 4.1 MMOL/L (ref 3.5–5.2)
PROT SERPL-MCNC: 6.6 G/DL (ref 6–8.5)
PROT UR QL STRIP: NEGATIVE
RBC # BLD AUTO: 4.46 10*6/MM3 (ref 3.77–5.28)
RBC # UR STRIP: ABNORMAL /HPF
REF LAB TEST METHOD: ABNORMAL
SODIUM SERPL-SCNC: 137 MMOL/L (ref 136–145)
SP GR UR STRIP: 1.01 (ref 1–1.03)
SQUAMOUS #/AREA URNS HPF: ABNORMAL /HPF
UROBILINOGEN UR QL STRIP: ABNORMAL
WBC # UR STRIP: ABNORMAL /HPF
WBC NRBC COR # BLD: 5.1 10*3/MM3 (ref 3.4–10.8)

## 2022-03-10 PROCEDURE — 80053 COMPREHEN METABOLIC PANEL: CPT | Performed by: FAMILY MEDICINE

## 2022-03-10 PROCEDURE — 85027 COMPLETE CBC AUTOMATED: CPT | Performed by: FAMILY MEDICINE

## 2022-03-10 PROCEDURE — 81001 URINALYSIS AUTO W/SCOPE: CPT | Performed by: FAMILY MEDICINE

## 2022-03-10 PROCEDURE — 99204 OFFICE O/P NEW MOD 45 MIN: CPT | Performed by: FAMILY MEDICINE

## 2022-03-10 RX ORDER — IBUPROFEN 200 MG
200 TABLET ORAL EVERY 6 HOURS PRN
COMMUNITY
End: 2022-11-21

## 2022-03-10 NOTE — PROGRESS NOTES
Subjective     Luzma Parks is a 37 y.o. female.     Chief Complaint   Patient presents with   • Establish Care   • Abdominal Pain     Left lower quadrant pain, ongoing for over a year       History of Present Illness     To establish care, discuss the followings ;    C/o chronic abd pain , mainly on LLQ for > 1 year,dull ache pain,  no relation with diet/ BM / cycle, some times worse with movement.  It is random pain, 4/10, on/off, dose not radiate.  Denies N,V, F,C  HAS NORMAL REGULAR BM  NO URINARY SX   No vag discharge or bleeding   Discussed with her OBGYN, US done , ALL LOOKS OK       The following portions of the patient's history were reviewed and updated as appropriate: allergies, current medications, past family history, past medical history, past social history, past surgical history and problem list.        Review of Systems   Gastrointestinal: Positive for abdominal pain. Negative for blood in stool, constipation, diarrhea, nausea, rectal pain, vomiting, GERD and indigestion.   Genitourinary: Negative for dysuria, frequency, menstrual problem, vaginal bleeding, vaginal discharge and vaginal pain.       Vitals:    03/10/22 0937   BP: 100/60   Pulse: 88   Resp: 16   Temp: 97.8 °F (36.6 °C)   SpO2: 98%           03/10/22  0937   Weight: 63 kg (138 lb 12.8 oz)         Body mass index is 23.82 kg/m².      Current Outpatient Medications   Medication Sig Dispense Refill   • ibuprofen (ADVIL,MOTRIN) 200 MG tablet Take 200 mg by mouth Every 6 (Six) Hours As Needed for Mild Pain .       No current facility-administered medications for this visit.                Objective   Physical Exam  Vitals and nursing note reviewed.   Constitutional:       General: She is not in acute distress.     Appearance: She is well-developed. She is not ill-appearing, toxic-appearing or diaphoretic.   HENT:      Head: Normocephalic.      Mouth/Throat:      Mouth: Mucous membranes are moist.      Pharynx: Oropharynx is clear.    Eyes:      General: No scleral icterus.     Conjunctiva/sclera: Conjunctivae normal.   Neck:      Thyroid: No thyromegaly.      Comments: No enlarged thyroid    Cardiovascular:      Rate and Rhythm: Normal rate and regular rhythm.      Heart sounds: Normal heart sounds. No murmur heard.    No friction rub. No gallop.   Pulmonary:      Effort: Pulmonary effort is normal. No respiratory distress.      Breath sounds: Normal breath sounds. No stridor. No wheezing, rhonchi or rales.   Abdominal:      General: Bowel sounds are normal. There is no distension.      Palpations: Abdomen is soft. There is no mass.      Tenderness: There is no abdominal tenderness. There is no guarding or rebound.      Hernia: No hernia is present.   Musculoskeletal:      Cervical back: Neck supple.   Skin:     General: Skin is warm.      Coloration: Skin is not jaundiced or pale.      Findings: No rash.   Neurological:      Mental Status: She is alert and oriented to person, place, and time.      Motor: No abnormal muscle tone.   Psychiatric:         Mood and Affect: Mood normal.         Behavior: Behavior normal.         Thought Content: Thought content normal.         Judgment: Judgment normal.           Assessment/Plan   Diagnoses and all orders for this visit:    1. Chronic LLQ pain (Primary)  - New problem, need w/u   -     CBC (No Diff); Future  -     Comprehensive Metabolic Panel; Future  -     CT Abdomen Pelvis With & Without Contrast; Future  -     Urinalysis With Microscopic - Urine, Clean Catch; Future     I was wearing N95 mask and eye protection during the entire duration of the visit.   Patient was masked the whole entire time.   Minimum social distance of 6 ft maintained through entire visit except for physical exam as documented.          I have fully discussed the nature of the medical condition(s) risks, complications, management, safe and proper use of medications.   I have discussed the SIDE EFFECT OF MEDICATION and  importance TO report any side effect , the patient expressed good understanding.  Encouraged medication compliance and the importance of keeping scheduled follow up appointments with me and any other providers.    Patient instructed to follow up with our office for results on any labs/imaging ordered during this visit.    Home care discussed  All questions answered  Patient verbalizes understanding and agrees to treatment plan.     Follow up: Return for WILL CALL YOU FOR LBAS/XR RESULT.

## 2022-03-13 RX ORDER — SULFAMETHOXAZOLE AND TRIMETHOPRIM 800; 160 MG/1; MG/1
1 TABLET ORAL 2 TIMES DAILY
Qty: 6 TABLET | Refills: 0 | Status: SHIPPED | OUTPATIENT
Start: 2022-03-13 | End: 2022-03-14

## 2022-03-14 ENCOUNTER — TELEPHONE (OUTPATIENT)
Dept: INTERNAL MEDICINE | Facility: CLINIC | Age: 37
End: 2022-03-14

## 2022-03-14 NOTE — TELEPHONE ENCOUNTER
----- Message from Jessica Webster MD sent at 3/13/2022  1:41 PM EDT -----  PLEASE call for lab results, all looks ok except of her urine which showed bacteria. Medication sent , need another urine test after she finish the Rx.  Still  waiting for CT abd result.

## 2022-03-14 NOTE — TELEPHONE ENCOUNTER
Patient contacted us to report a reaction she felt she was experiencing from her Bactrum to treat a UTI. Patient states she has never had any issues with taking it before. She reports ten minutes after taking is her throat became itchy and her gums have just started to swell. It was advised after speaking with Dr Webster for her to go to the emergency room at once. Patient understood and will follow through with this. She will need a different antibiotic to treat and I will ask provider to send in something else. She is also not able to take any of the Penicillins.

## 2022-03-14 NOTE — TELEPHONE ENCOUNTER
Spoke to pt, she feels ok , all her sx resolved after she took benadryl   No new concern   Advised to stop taking Bactrim , keep self well hydrated.      mitral regurgitation

## 2022-03-15 RX ORDER — NITROFURANTOIN 25; 75 MG/1; MG/1
100 CAPSULE ORAL 2 TIMES DAILY
Qty: 14 CAPSULE | Refills: 0 | Status: SHIPPED | OUTPATIENT
Start: 2022-03-15 | End: 2022-11-21

## 2022-03-15 NOTE — TELEPHONE ENCOUNTER
Patient called, stated she spoke with Dr Webster and was under the impression she would have another prescription sent in place of the one she had an allergic reaction to. No recent prescriptions were called in. Please advise.

## 2022-03-15 NOTE — TELEPHONE ENCOUNTER
Informed the patient that a new antibiotic was called into her pharmacy for her, she verbalized a good understanding

## 2022-03-18 ENCOUNTER — TELEPHONE (OUTPATIENT)
Dept: OBSTETRICS AND GYNECOLOGY | Facility: CLINIC | Age: 37
End: 2022-03-18

## 2022-03-18 NOTE — TELEPHONE ENCOUNTER
Pt. Reports pain in the left breast. COVID + in Feb. After that she had lymph node swelling in the left armpit. Recommended eval here first

## 2022-03-21 ENCOUNTER — OFFICE VISIT (OUTPATIENT)
Dept: OBSTETRICS AND GYNECOLOGY | Facility: CLINIC | Age: 37
End: 2022-03-21

## 2022-03-21 VITALS — DIASTOLIC BLOOD PRESSURE: 62 MMHG | SYSTOLIC BLOOD PRESSURE: 106 MMHG | WEIGHT: 139.2 LBS | BODY MASS INDEX: 23.89 KG/M2

## 2022-03-21 DIAGNOSIS — N64.4 BREAST PAIN, LEFT: Primary | ICD-10-CM

## 2022-03-21 PROCEDURE — 99212 OFFICE O/P EST SF 10 MIN: CPT | Performed by: NURSE PRACTITIONER

## 2022-03-21 NOTE — PROGRESS NOTES
Chief Complaint   Patient presents with   • Breast Check       Subjective   HPI  Luzma Parks is a 37 y.o. female, , who presents for a breast exam.    Patient reports left breast tenderness that extends into her underarm.  She admits that she had COVID in 2021, and her lymph nodes were swollen and tender at that time.  She does not feel as if the tenderness has resolved, but the lymph nodes are no longer swollen.  She describes breast discomfort as tenderness around her nipple, and a burning sensation through out her whole breast and her underarm.  She has previously had a mammogram 2021 with calcifications.      She denies issues with her right breast, and is unsure if she feels any lumps within the left breast.  She also denies skin or nipple changes.       Her last LMP was Patient's last menstrual period was 2022 (approximate)..  Periods are regular every 28-30 days, lasting 5-6 days.  Dysmenorrhea:moderate to severe, occurring throughout menses.  Patient reports problems with: none.  Partner Status: Marital Status: .        Additional OB/GYN History   Current contraception: contraceptive methods: None  Desires to: do not start contraception  Last Pap : 2021  Last Completed Pap Smear          Ordered - PAP SMEAR (Every 3 Years) Ordered on 2021  SCANNED - PAP SMEAR    2019  Done - neg              Last mammogram: 2021  Last Completed Mammogram     This patient has no relevant Health Maintenance data.        Tobacco Usage?: No   OB History        3    Para   2    Term   2            AB   1    Living   2       SAB   1    IAB        Ectopic        Molar        Multiple   0    Live Births   2                Health Maintenance   Topic Date Due   • ANNUAL PHYSICAL  Never done   • TDAP/TD VACCINES (1 - Tdap) Never done   • HEPATITIS C SCREENING  Never done   • Annual Gynecologic Pelvic and Breast Exam  2022   • PAP SMEAR  2024    • COVID-19 Vaccine  Completed   • INFLUENZA VACCINE  Completed   • Pneumococcal Vaccine 0-64  Aged Out       The additional following portions of the patient's history were reviewed and updated as appropriate: allergies, current medications, past family history, past medical history, past social history, past surgical history and problem list.    Review of Systems   Constitutional: Negative for chills and fever.   Genitourinary: Positive for breast pain. Negative for breast discharge and breast lump.   All other systems reviewed and are negative.      I have reviewed and agree with the HPI, ROS, and historical information as entered above. Nikky Teixeira, LAYA    Objective   /62   Wt 63.1 kg (139 lb 3.2 oz)   LMP 03/18/2022 (Approximate)   Breastfeeding No   BMI 23.89 kg/m²     Physical Exam  Vitals and nursing note reviewed. Exam conducted with a chaperone present.   Constitutional:       Appearance: Normal appearance.   HENT:      Head: Normocephalic and atraumatic.   Pulmonary:      Effort: Pulmonary effort is normal.   Chest:   Breasts:      Right: No mass, nipple discharge, skin change or tenderness.      Left: No mass, nipple discharge, skin change or tenderness.       Neurological:      Mental Status: She is alert and oriented to person, place, and time.   Psychiatric:         Behavior: Behavior normal.         Assessment/Plan     Assessment     Problem List Items Addressed This Visit    None     Visit Diagnoses     Breast pain, left    -  Primary    Relevant Orders    Mammo Diagnostic Digital Tomosynthesis Left With CAD          Plan     1. Left breast pain/burning sensation. Proceed with left diagnostic mammogram.       LAYA Hoover  03/21/2022

## 2022-03-24 ENCOUNTER — HOSPITAL ENCOUNTER (OUTPATIENT)
Dept: CT IMAGING | Facility: HOSPITAL | Age: 37
Discharge: HOME | End: 2022-03-24
Admitting: FAMILY MEDICINE

## 2022-03-24 DIAGNOSIS — G89.29 CHRONIC LLQ PAIN: ICD-10-CM

## 2022-03-24 DIAGNOSIS — R10.32 CHRONIC LLQ PAIN: ICD-10-CM

## 2022-03-24 PROCEDURE — 25010000002 IOPAMIDOL 61 % SOLUTION: Performed by: FAMILY MEDICINE

## 2022-03-24 PROCEDURE — 74178 CT ABD&PLV WO CNTR FLWD CNTR: CPT

## 2022-03-24 RX ADMIN — IOPAMIDOL 85 ML: 612 INJECTION, SOLUTION INTRAVENOUS at 15:50

## 2022-03-28 ENCOUNTER — TELEPHONE (OUTPATIENT)
Dept: INTERNAL MEDICINE | Facility: CLINIC | Age: 37
End: 2022-03-28

## 2022-03-28 NOTE — TELEPHONE ENCOUNTER
----- Message from Jessica Webster MD sent at 3/28/2022  2:30 PM EDT -----  PLEASE call for normal CT abd results

## 2022-05-03 ENCOUNTER — HOSPITAL ENCOUNTER (OUTPATIENT)
Dept: MAMMOGRAPHY | Facility: HOSPITAL | Age: 37
Discharge: HOME | End: 2022-05-03

## 2022-05-03 ENCOUNTER — HOSPITAL ENCOUNTER (OUTPATIENT)
Dept: ULTRASOUND IMAGING | Facility: HOSPITAL | Age: 37
Discharge: HOME | End: 2022-05-03

## 2022-05-03 DIAGNOSIS — N64.4 BREAST PAIN, LEFT: ICD-10-CM

## 2022-05-03 PROCEDURE — 76641 ULTRASOUND BREAST COMPLETE: CPT | Performed by: RADIOLOGY

## 2022-05-03 PROCEDURE — 76641 ULTRASOUND BREAST COMPLETE: CPT

## 2022-05-03 PROCEDURE — G0279 TOMOSYNTHESIS, MAMMO: HCPCS

## 2022-05-03 PROCEDURE — 77066 DX MAMMO INCL CAD BI: CPT

## 2022-05-03 PROCEDURE — 77062 BREAST TOMOSYNTHESIS BI: CPT | Performed by: RADIOLOGY

## 2022-05-03 PROCEDURE — 77066 DX MAMMO INCL CAD BI: CPT | Performed by: RADIOLOGY

## 2022-11-21 ENCOUNTER — OFFICE VISIT (OUTPATIENT)
Dept: INTERNAL MEDICINE | Facility: CLINIC | Age: 37
End: 2022-11-21

## 2022-11-21 VITALS
WEIGHT: 140.6 LBS | OXYGEN SATURATION: 100 % | DIASTOLIC BLOOD PRESSURE: 68 MMHG | BODY MASS INDEX: 23.43 KG/M2 | HEART RATE: 84 BPM | SYSTOLIC BLOOD PRESSURE: 112 MMHG | HEIGHT: 65 IN | TEMPERATURE: 96.8 F

## 2022-11-21 DIAGNOSIS — R10.32 LLQ PAIN: Primary | ICD-10-CM

## 2022-11-21 DIAGNOSIS — K59.00 CONSTIPATION, UNSPECIFIED CONSTIPATION TYPE: ICD-10-CM

## 2022-11-21 DIAGNOSIS — T14.8XXA MUSCLE STRAIN: ICD-10-CM

## 2022-11-21 PROCEDURE — 99213 OFFICE O/P EST LOW 20 MIN: CPT | Performed by: STUDENT IN AN ORGANIZED HEALTH CARE EDUCATION/TRAINING PROGRAM

## 2022-11-21 NOTE — PROGRESS NOTES
Office Note     Name: Luzma Parks    : 1985     MRN: [id number]     Chief Complaint  Left lower quandrant (Chronic 2 years)    Subjective     History of Present Illness:  Lumza Parks is a 37 y.o. female who presents today for     LLQ pain  Has been a chronic on and off pain for the past 2 years. Describe as a deep ache, patient states she  Think she can feel a knot in her lower pelvic area. Certain moving's make it worse such bending, sitting for prolonged periods. Patient exercises daily and tries to do core strengthening exercises. Denies any abdominal pain, no prior abdominal surgeries, no c-sections.  Doesn't take any medications, no loss of muscle strength. No back pain. No relation with diet,/ BM. Pain randomly occurs. No urinary symptoms, no nausea no vomiting. No melena, no hematochezia.  No vaginal discharge, no bleeding. Had abdominal CT in 2022, which did not show any acute pathology.    Constipation  Bowel movements: Constipated( 2x a week) , takes laxative, strains,         The following portions of the patient's history were reviewed and updated as appropriate: allergies, current medications, past family history, past medical history, past social history, past surgical history and problem list.             Review of Systems:   Review of Systems   All other systems reviewed and are negative.      Past Medical History:   Past Medical History:   Diagnosis Date   • Breast pain 2020    L breast   • Eczema    • Fractured hand    • History of miscarriage    • Ovarian cyst     h/o        Past Surgical History:   Past Surgical History:   Procedure Laterality Date   • WISDOM TOOTH EXTRACTION         Family History:   Family History   Problem Relation Age of Onset   • Rheum arthritis Mother    • Atrial fibrillation Mother    • Diabetes Mother    • Dementia Paternal Grandmother    • Breast cancer Neg Hx    • Ovarian cancer Neg Hx    • Uterine cancer Neg Hx    • Colon cancer  "Neg Hx        Social History:   Social History     Socioeconomic History   • Marital status:      Spouse name: Sujit Parks    • Number of children: 2   • Years of education: college   Tobacco Use   • Smoking status: Never   • Smokeless tobacco: Never   Vaping Use   • Vaping Use: Never used   Substance and Sexual Activity   • Alcohol use: No   • Drug use: No   • Sexual activity: Yes     Partners: Male     Birth control/protection: None       Immunizations:   Immunization History   Administered Date(s) Administered   • COVID-19 (MODERNA) 1st, 2nd, 3rd Dose Only 01/23/2021, 02/27/2021, 11/20/2021   • Influenza, Unspecified 10/01/2021        Medications:   No current outpatient medications on file.    Allergies:   Allergies   Allergen Reactions   • Penicillins Rash       Objective     Vital Signs  /68   Pulse 84   Temp 96.8 °F (36 °C) (Temporal)   Ht 165 cm (64.96\")   Wt 63.8 kg (140 lb 9.6 oz)   SpO2 100%   BMI 23.43 kg/m²   Estimated body mass index is 23.43 kg/m² as calculated from the following:    Height as of this encounter: 165 cm (64.96\").    Weight as of this encounter: 63.8 kg (140 lb 9.6 oz).    BMI is within normal parameters. No other follow-up for BMI required.      Physical Exam  Musculoskeletal:         General: Tenderness present.      Right hip: Normal.      Left hip: Tenderness present. Decreased range of motion. Normal strength.      Right upper leg: Normal.      Left upper leg: Normal.      Right knee: Normal.      Left knee: Normal.      Right lower leg: No edema.      Left lower leg: No edema.        Legs:           Result Review :                CT ABDOMEN PELVIS W WO CONTRAST     Date of Exam: 3/24/2022 15:25 EDT     Indication: Abdominal abscess/infection suspected.     Comparison: None available.     Technique: Contiguous axial CT images were obtained from the lung bases to the superior iliac crests without contrast.  Following uneventful administration of 85 mL is Isovue-300 " intravenous and oral contrast, contiguous axial images obtained from lung   bases to the pubic symphysis. Sagittal and coronal reconstructions were performed.  Automated exposure control and iterative reconstruction methods were used.  Radiation audit for number of CT and nuclear cardiology exams performed in the last year:  0.               FINDINGS:     Lower Chest: Lung bases are clear.     No free air noted beneath the diaphragm.     Organs: The liver, gallbladder, spleen, pancreas, kidneys and adrenal glands are unremarkable in appearance.     Delayed imaging demonstrates no evidence of obstruction of the renal collecting system. Bilateral ureteral jets are seen within the bladder.      GI/Bowel: The stomach and the small bowel are unremarkable in appearance     No suspicious mesenteric adenopathy or fluid collection is noted. Scattered lymph nodes within the mesentery are likely reactive. Ileocecal valve and the appendix appear unremarkable. The colon is unremarkable in appearance.     Pelvis: Urinary bladder, uterus and ovaries are grossly unremarkable in appearance     Peritoneum/Retroperitoneum: The aorta is normal in caliber. No suspicious retroperitoneal adenopathy.     Bones/Soft Tissues: No destructive bone lesion.     IMPRESSION:  No acute intra-abdominal or intrapelvic abnormality appreciated.              LLQ pain        Comparison Studies  =================     There are no relevant prior studies to which this study is being compared        Method  =======     [place] E6, Transvaginal ultrasound examination, Color Doppler flow performed, 3D ultrasound examination. View: Adequate view        Uterus  ======     Uterus:  Visualized  Uterus position: Anteverted  Description of uterine malformations:       none, none  Myometrium:      Homogeneous  Endometrium:    Uniform,  Cervix details:    Normal,  Uterus long        82 mm  Uterus ap           47 mm  Uterus tr             52 mm  Uterus Vol           104.9 cm³  Endometrial thickness, total        11.0 mm  Fibroids:             No fibroids identified  Polyps:  No polyps identified        Right Ovary  ==========     Rt ovary:            Visualized  Rt ovary D1       22.0 mm  Rt ovary D2       16.4 mm  Rt ovary D3       9.7 mm  Rt ovary Vol       1.8 cm³  Rt ovarian cyst(s):          No cysts identified  Rt ovarian follicle D1      17.4 mm  Rt ovarian follicle D2      9.1 mm  Rt ovarian follicle D3      13.5 mm  Rt ovarian follicle mean  13.3 mm  Rt ovarian follicle vol      1.115 cm³  Rt ovarian follicle findings:           possible tubal cyst        Left Ovary  =========     Lt ovary:             Normal  Lt ovary D1        29.0 mm  Lt ovary D2        21.9 mm  Lt ovary D3        17.00 mm  Lt ovary Vol       5.7 cm³  Lt ovarian corpus luteum D1       16.0 mm  Lt ovarian corpus luteum D2       11.0 mm  Lt ovarian corpus luteum D3       10.0 mm        Cul de Sac  =========     Normal. No free fluid visualized        Impression  =========     The uterus is normal in size  The endometrium appears sonographically normal in shape and appearance  The right ovary appear sonographically normal in size, shape and morphology.  1.7 cm paratubal cyst on right.  The right ovary appear sonographically normal in size, shape and morphology  The left ovary appear sonographically normal in size, shape and morphology           Sonographer: Lizzeth Brennan, RN, RDMS  Physician: Claudette Pool MD, FACOG     Assessment and Plan     1. LLQ pain  Reviewed prior CT-abdomen/pelvis imaging. No acute pathology noted  Reviewed prior U/S vaginal: no acute pathology noted  On physical exam, tenderness to the deep muscle structures of QL and psoamajor.   Recommend OTC NSAID for muscle pain.  - XR Hips Bilateral With or Without Pelvis 2 View; Future  - US Abdomen Limited; Future    2. Muscle strain    On physical exam, tenderness to the deep muscle structures of QL and psoamajor.   Recommend OTC  NSAID for muscle pain.    3. Constipation, unspecified constipation type    -Recommend to increase dietary intake.   -Educational information given for dietary fiber.       Follow Up  Return in about 4 weeks (around 12/19/2022) for imaging .    MD CHARLENE Espino [facility] [address]  [facility]  [address]  [address] [address]  [phone]

## 2022-11-23 ENCOUNTER — PATIENT ROUNDING (BHMG ONLY) (OUTPATIENT)
Dept: INTERNAL MEDICINE | Facility: CLINIC | Age: 37
End: 2022-11-23

## 2022-11-23 NOTE — PROGRESS NOTES
A [employer] message has been sent to the patient for [place] [facility] [place]

## 2022-11-29 ENCOUNTER — HOSPITAL ENCOUNTER (OUTPATIENT)
Dept: ULTRASOUND IMAGING | Facility: HOSPITAL | Age: 37
End: 2022-11-29

## 2022-12-07 ENCOUNTER — HOSPITAL ENCOUNTER (OUTPATIENT)
Dept: GENERAL RADIOLOGY | Facility: HOSPITAL | Age: 37
Discharge: HOME | End: 2022-12-07

## 2022-12-07 ENCOUNTER — HOSPITAL ENCOUNTER (OUTPATIENT)
Dept: ULTRASOUND IMAGING | Facility: HOSPITAL | Age: 37
Discharge: HOME | End: 2022-12-07

## 2022-12-07 ENCOUNTER — HOSPITAL ENCOUNTER (OUTPATIENT)
Dept: ULTRASOUND IMAGING | Facility: HOSPITAL | Age: 37
End: 2022-12-07

## 2022-12-07 DIAGNOSIS — R10.32 LLQ PAIN: ICD-10-CM

## 2022-12-07 PROCEDURE — 73521 X-RAY EXAM HIPS BI 2 VIEWS: CPT

## 2022-12-07 PROCEDURE — 76705 ECHO EXAM OF ABDOMEN: CPT

## 2022-12-12 DIAGNOSIS — K40.90 UNILATERAL INGUINAL HERNIA WITHOUT OBSTRUCTION OR GANGRENE, RECURRENCE NOT SPECIFIED: Primary | ICD-10-CM

## 2022-12-15 ENCOUNTER — OFFICE VISIT (OUTPATIENT)
Dept: OBSTETRICS AND GYNECOLOGY | Facility: CLINIC | Age: 37
End: 2022-12-15

## 2022-12-15 VITALS
BODY MASS INDEX: 23.66 KG/M2 | WEIGHT: 142 LBS | SYSTOLIC BLOOD PRESSURE: 112 MMHG | HEIGHT: 65 IN | DIASTOLIC BLOOD PRESSURE: 74 MMHG

## 2022-12-15 DIAGNOSIS — N64.4 BREAST TENDERNESS: ICD-10-CM

## 2022-12-15 DIAGNOSIS — N64.4 MASTODYNIA: ICD-10-CM

## 2022-12-15 DIAGNOSIS — Z30.011 ENCOUNTER FOR ORAL CONTRACEPTION INITIAL PRESCRIPTION: Primary | ICD-10-CM

## 2022-12-15 PROCEDURE — 99213 OFFICE O/P EST LOW 20 MIN: CPT | Performed by: OBSTETRICS & GYNECOLOGY

## 2022-12-15 RX ORDER — NORETHINDRONE ACETATE AND ETHINYL ESTRADIOL, ETHINYL ESTRADIOL AND FERROUS FUMARATE 1MG-10(24)
1 KIT ORAL DAILY
Qty: 84 TABLET | Refills: 3 | Status: SHIPPED | OUTPATIENT
Start: 2022-12-15

## 2022-12-15 NOTE — PROGRESS NOTES
OBGYN Office Note      Chief Complaint   Patient presents with   • Follow-up        Subjective     HPI  Luzma Parks is a 37 y.o. female, , who presents with breast complaints of a tenderness bilaterally. She has been evaluated by HAYDEN Teixeira 3/21/22 for L breast pain followed by a diagnostic mammogram. Now she is having pain bilaterally. She reports pain is constant but increased in past couple of weeks. At worst pain is 9/10 and constantly 5/10. She reports activity makes pain worse even walking. She also reports increased soreness around nipples bilaterally. Pt also reports L breast protrudes from chest more than R.     She states she has experienced this problem for 1 year. The problem has worsened  since it began. The patient denies changed mole, dryness, nipple discharge, pruritus, rash and skin color change. She has had a mammogram before. She does not have a family history of breast cancer.. Other concerns include: none    Her last LMP was Patient's last menstrual period was 2022 (exact date)..  Her periods are fairly regular, heavy and lots of cramping.  She feels the last couple have been early.       Current contraception: contraceptive methods: Vasectomy     Last mammogram:   Last Completed Mammogram     This patient has no relevant Health Maintenance data.        Tobacco Usage?: No     Past Medical History:   Diagnosis Date   • Breast pain 2020    L breast   • Eczema    • Fractured hand    • History of miscarriage    • Ovarian cyst     h/o        Past Surgical History:   Procedure Laterality Date   • WISDOM TOOTH EXTRACTION         Family History   Problem Relation Age of Onset   • Rheum arthritis Mother    • Atrial fibrillation Mother    • Diabetes Mother    • Dementia Paternal Grandmother    • Breast cancer Neg Hx    • Ovarian cancer Neg Hx    • Uterine cancer Neg Hx    • Colon cancer Neg Hx           Current Outpatient Medications:   •  Lo Loestrin Fe 1 MG-10 MCG / 10 MCG  "tablet, Take 1 tablet by mouth Daily., Disp: 84 tablet, Rfl: 3     Review of Systems   Constitutional: Negative.    HENT: Negative.    Eyes: Negative.    Respiratory: Negative.    Cardiovascular: Negative.    Gastrointestinal: Negative.    Endocrine: Negative.    Genitourinary: Negative.  Positive for breast pain.   Musculoskeletal: Negative.    Skin: Negative.    Allergic/Immunologic: Negative.    Neurological: Negative.    Hematological: Negative.    Psychiatric/Behavioral: Negative.        I have reviewed and agree with the HPI, ROS, and historical information as entered above. Elizabeth Condon MD    Objective   /74   Ht 165 cm (64.96\")   Wt 64.4 kg (142 lb)   LMP 12/05/2022 (Exact Date)   BMI 23.66 kg/m²     Physical Exam  Vitals and nursing note reviewed. Exam conducted with a chaperone present.   HENT:      Head: Normocephalic and atraumatic.   Pulmonary:      Effort: Pulmonary effort is normal. No respiratory distress or retractions.   Chest:      Chest wall: No mass.   Breasts:     Right: No swelling, bleeding, inverted nipple, mass, nipple discharge, skin change or tenderness.      Left: No swelling, bleeding, inverted nipple, mass, nipple discharge, skin change or tenderness.   Lymphadenopathy:      Upper Body:      Right upper body: No supraclavicular or axillary adenopathy.      Left upper body: No supraclavicular or axillary adenopathy.   Neurological:      Mental Status: She is alert.   Psychiatric:         Mood and Affect: Mood and affect normal.         Speech: Speech normal.           Assessment & Plan     Assessment     Problem List Items Addressed This Visit    None  Visit Diagnoses     Encounter for oral contraception initial prescription    -  Primary    Relevant Medications    Lo Loestrin Fe 1 MG-10 MCG / 10 MCG tablet    Breast tenderness        Mastodynia                Plan   1. The patient returns today for further evaluation complaining of continued breast pain and extreme breast " tenderness.  The patient had presented for evaluation earlier this year in March complaining of predominantly left-sided breast pain and tenderness.  She underwent a diagnostic bilateral mammogram on 5/3/2022 and a left breast ultrasound which were within normal limits.  Today the patient reports that she has continued to have bilateral intermittent episodes of breast tenderness and that these have been present approximately 1 to 3 days/month.  They do not seem to correlate to her menstrual cycle.  She currently reports fairly regular menses that are heavy and painful.  The last 2 cycles have come early, but she does not chart these.  The patient reports that over the last 2 weeks her breast pain became more prominent and it has been constant and present 24/7, the left side worse than the right.  She reports occasional burning pain of the skin of the left breast and nipple, but also complains of a throbbing achy pain in bilateral breasts.  She does note extreme tenderness with touching or increased activity.  She does report that she has cut out caffeine for the last week and her right breast has had significant improvement in the symptoms we did proceed with a breast exam today, which was benign.  The patient has not noted any masses or lumps, skin changes, etc.  We did discuss wearing a good supportive bra and a proper fitting bra, decreasing caffeine and we also reviewed Vitamine E supplementation and evening primrose oil supplementation as a trial.  She would like to begin a trial of OCPs to see if this helps and she was given a sample of lo Loestrin today.  She will start this with her next cycle.  If her pain is improving we will continue the OCPs for now.  We did discuss a transition to different OCPs if somewhat improved, but not fully resolved.  We discussed that if her symptoms are not improving we would proceed with breast MRI.  2. The patient was counseled on risks of OCPs including increased risks for  thromboembolic disease including dvt, pulmonary embolus, stroke and death.  We reviewed theoretical risks for breast cancer.  We also discussed risks for hypertension.  We reviewed that the risks are increased due to age being 35 or older and she desires to continue her OCPs.  The patient confirms she is a nonsmoker.  Return for Annual physical.      Elizabeth Condon MD  12/15/2022

## 2022-12-19 ENCOUNTER — TELEMEDICINE (OUTPATIENT)
Dept: INTERNAL MEDICINE | Facility: CLINIC | Age: 37
End: 2022-12-19

## 2022-12-19 DIAGNOSIS — R10.32 LLQ PAIN: Primary | ICD-10-CM

## 2022-12-19 DIAGNOSIS — K40.90 UNILATERAL INGUINAL HERNIA WITHOUT OBSTRUCTION OR GANGRENE, RECURRENCE NOT SPECIFIED: ICD-10-CM

## 2022-12-19 PROCEDURE — 99213 OFFICE O/P EST LOW 20 MIN: CPT | Performed by: STUDENT IN AN ORGANIZED HEALTH CARE EDUCATION/TRAINING PROGRAM

## 2022-12-19 NOTE — PROGRESS NOTES
Telehealth Visit     Date: 2022   Patient Name: Luzma Parks  : 1985   MRN: [id number]     Chief Complaint:    Chief Complaint   Patient presents with   • Hernia       This provider is located at the [facility] in [place], [place]. The patient is being seen remotely via telehealth at their home address in [place], and stated they are in a secure environment for this session. The patient's condition being diagnosed/treated is appropriate for telemedicine. The provider identified himself as well as his credentials. The patient, and/or patients guardian, consent to be seen remotely, and when consent is given they understand that the consent allows for patient identifiable information to be sent to a third party as needed. They may refuse to be seen remotely at any time. The electronic data is encrypted and password protected, and the patient and/or guardian has been advised of the potential risks to privacy not withstanding such measures.    You have chosen to receive care through a telehealth visit. Do you consent to use a video/audio connection for your medical care today? Yes    History of Present Illness: Luzma Parks is a 37 y.o. female who is here today to follow up with hernia      Subjective        Follow up on hernia    Experiencing llq pain, had subsequent U/S imaging noted suspicious for left inguinal hernia.  Pain worsens with valsalva maneuver. Pain worsens with prolonged sitting and bending.   No acute worsening of abdominal pain, no obvious bulging at the groin area, no N/V, normal bowel movements.         Review of Systems:   Review of Systems    I have reviewed and the following portions of the patient's history were updated as appropriate: past family history, past medical history, past social history, past surgical history and problem list.    Medications:     Current Outpatient Medications:   •  Lo Loestrin Fe 1 MG-10 MCG / 10 MCG  tablet, Take 1 tablet by mouth Daily., Disp: 84 tablet, Rfl: 3    Allergies:   Allergies   Allergen Reactions   • Penicillins Rash       Objective     Physical Exam:  Vital Signs: There were no vitals filed for this visit.  There is no height or weight on file to calculate BMI.    Physical Exam   Limited due to telemedicine visit    Narrative & Impression   US ABDOMEN LIMITED     Date of Exam: 12/7/2022 9:22 AM EST     Indication: LLQ pain, near psoas muscle check for possible femoral or inguinal hernia.     Comparison: No comparisons available.     Technique: Grayscale and color Doppler ultrasound evaluation of the left lower quadrant was performed.     Findings:  At the site of the patient's palpable finding in the left lower quadrant there is suspicion of a left inguinal hernia which appears to worsen with Valsalva maneuver.     IMPRESSION:  Impression:  Suspicion for left inguinal hernia at patient's palpable site which worsens with Valsalva, recommend CT pelvis without contrast for confirmation.     Electronically Signed: Paul Bauman MD    12/7/2022 8:43 PM EST    Workstation ID: [id number]         Assessment / Plan      Assessment/Plan:   1. LLQ pain  2. Unilateral inguinal hernia without obstruction or gangrene, recurrence not specified  -General surgery referral placed   -ED/RTC precautions given for worsening symptoms.      Follow Up:   No follow-ups on file.    Any medications prescribed have been sent electronically to   [facility] - [place], [place] - [address] [facility] - [phone]  - [phone] [address] [place] [address]  Phone: [phone] Fax: [phone]      30 minutes were spent reviewing the patient's questionnaire, formulating a treatment plan, and relaying information to the patient via [employer].    Scott Tejeda MD

## 2023-04-12 ENCOUNTER — TELEPHONE (OUTPATIENT)
Dept: OBSTETRICS AND GYNECOLOGY | Facility: CLINIC | Age: 38
End: 2023-04-12
Payer: COMMERCIAL

## 2023-08-22 NOTE — LACTATION NOTE
12/10/19 1155   Maternal Information   Date of Referral 12/10/19   Person Making Referral nurse   Maternal Reason for Referral milk supply inadequate history   Equipment Type   Breast Pump Type double electric, personal  (old pump at home; gave rx/instructions to get new pump)   Reproductive Interventions   Breastfeeding Assistance support offered;feeding cue recognition promoted;feeding on demand promoted   Breastfeeding Support diary/feeding log utilized;encouragement provided;lactation counseling provided   Breast Pumping   Breast Pumping Interventions post-feed pumping encouraged   Coping/Psychosocial Interventions   Parent/Child Attachment Promotion cue recognition promoted;face-to-face positioning promoted;positive reinforcement provided;rooming-in promoted;skin-to-skin contact encouraged;strengths emphasized   Supportive Measures active listening utilized   Mom states baby has fed well a couple times. Worried because of inadequate milk supply. Discussed pumping after feedings but pt not sure she wants to do that. Teaching done, as documented under Education. To call lactation services, if there are questions, concerns, if mom wants to initiate pumping or if mom needs help with a feeding.    Home Suture Removal Text: Patient was provided instructions on removing sutures and will remove their sutures at home.  If they have any questions or difficulties they will call the office.

## 2023-12-28 DIAGNOSIS — N92.6 IRREGULAR PERIODS: Primary | ICD-10-CM

## 2024-01-02 ENCOUNTER — OFFICE VISIT (OUTPATIENT)
Dept: OBSTETRICS AND GYNECOLOGY | Facility: CLINIC | Age: 39
End: 2024-01-02
Payer: COMMERCIAL

## 2024-01-02 VITALS
BODY MASS INDEX: 24.66 KG/M2 | DIASTOLIC BLOOD PRESSURE: 70 MMHG | WEIGHT: 148 LBS | SYSTOLIC BLOOD PRESSURE: 108 MMHG | HEIGHT: 65 IN

## 2024-01-02 DIAGNOSIS — D25.9 UTERINE LEIOMYOMA, UNSPECIFIED LOCATION: ICD-10-CM

## 2024-01-02 DIAGNOSIS — N94.6 DYSMENORRHEA: Primary | ICD-10-CM

## 2024-01-02 DIAGNOSIS — N83.202 CYST OF LEFT OVARY: ICD-10-CM

## 2024-01-02 DIAGNOSIS — Z30.011 ENCOUNTER FOR INITIAL PRESCRIPTION OF CONTRACEPTIVE PILLS: ICD-10-CM

## 2024-01-02 DIAGNOSIS — N92.1 MENORRHAGIA WITH IRREGULAR CYCLE: ICD-10-CM

## 2024-01-02 PROCEDURE — 99214 OFFICE O/P EST MOD 30 MIN: CPT

## 2024-01-02 RX ORDER — ACETAMINOPHEN AND CODEINE PHOSPHATE 120; 12 MG/5ML; MG/5ML
1 SOLUTION ORAL DAILY
Qty: 28 TABLET | Refills: 3 | Status: SHIPPED | OUTPATIENT
Start: 2024-01-02 | End: 2024-04-23

## 2024-01-02 NOTE — PROGRESS NOTES
"          Chief Complaint   Patient presents with    Menstrual Problem     Abnormal periods         Subjective   HPI  Luzma Parks is a 38 y.o. female, , her last LMP was Patient's last menstrual period was 2023 (exact date)..  She presents for an evaluation of Abnormal Uterine Bleeding, Menorrhagia, and Dysmenorrhea. The patient was last seen since 12/15/2022  ago by  Dr. Condon . This has not been an issue in the past. The patient reports additional symptoms as heavy bleeding. The patient uses 2-3 of tampons/pads per hour. Patient typically has had regular periods, about every 28-30 days that last about 5 days with the first 1-3 days being heavy and lightening up after that. Starting in 2023 patient reports she had two very heavy periods with clots and with severe cramping. Patient reports cramping was so severe that it woke her in the middle of the night. Patient reports there were about 21 days in between her two periods. Patient reports her most recent period started on 23 which was about 18-20 days after the previous period ended. Patient states her most recent period was also very heavy bleeding but she had less cramping. Patient also reports she is having some vaginal pain and states she feels a \"heaviness\" in her vaginal area that radiates pain into her lower left butt area.     US was done today.       Additional OB/GYN History   Last Pap :   Last Completed Pap Smear            PAP SMEAR (Every 3 Years) Next due on 2021  SCANNED - PAP SMEAR    2019  Done - neg                  History of abnormal Pap smear: no  Tobacco Usage?: No       Current Outpatient Medications:     norethindrone (Mali) 0.35 MG tablet, Take 1 tablet by mouth Daily for 112 days., Disp: 28 tablet, Rfl: 3     Past Medical History:   Diagnosis Date    Breast pain 2020    L breast    Eczema     Fractured hand     History of miscarriage     Ovarian cyst     h/o     PMS " "(premenstrual syndrome)     Uterine leiomyoma 1/2/2024        Past Surgical History:   Procedure Laterality Date    WISDOM TOOTH EXTRACTION         The additional following portions of the patient's history were reviewed and updated as appropriate: allergies, current medications, past family history, past medical history, past social history, past surgical history, and problem list.    Review of Systems   Genitourinary:  Positive for menstrual problem and pelvic pain.   All other systems reviewed and are negative.      I have reviewed and agree with the HPI, ROS, and historical information as entered above. Jennifer Hagen, APRN      Objective   /70   Ht 165.1 cm (65\")   Wt 67.1 kg (148 lb)   LMP 12/20/2023 (Exact Date)   BMI 24.63 kg/m²     Physical Exam  Vitals and nursing note reviewed.   Constitutional:       General: She is not in acute distress.     Appearance: Normal appearance. She is not ill-appearing, toxic-appearing or diaphoretic.   Cardiovascular:      Rate and Rhythm: Normal rate.   Pulmonary:      Effort: Pulmonary effort is normal. No respiratory distress.   Abdominal:      Palpations: Abdomen is soft.   Genitourinary:     Comments: Pt declined pelvic exam today.  Neurological:      Mental Status: She is alert and oriented to person, place, and time.   Psychiatric:         Mood and Affect: Mood normal.         Behavior: Behavior normal.         Thought Content: Thought content normal.         Judgment: Judgment normal.         Assessment & Plan     Assessment     Problem List Items Addressed This Visit          Genitourinary and Reproductive     Dysmenorrhea - Primary    Relevant Medications    norethindrone (Mali) 0.35 MG tablet    Menorrhagia with irregular cycle    Relevant Medications    norethindrone (Mali) 0.35 MG tablet    Uterine leiomyoma    Overview     1/2/24 Uterine fibroids: 15.2mm x 14.7mm x 16.6mm anterior & 20.8mm x 27.5mm x 17mm posterior.         Cyst of left ovary    " Overview     1/2/24 2.0mm x 2.0mm x 2.1mm complex  1.5mm x 1.8mm x 1.1mm complex  Free fluid around left ovary [id number]          Other Visit Diagnoses       Encounter for initial prescription of contraceptive pills        Relevant Medications    norethindrone (Mali) 0.35 MG tablet              Plan     Ultrasound showed Left ovary 2.0mm x 2.0mm x 2.1mm complex cyst & 1.5mm x 1.8mm x 1.1mm complex cyst Free fluid around left ovary [id number]. Uterine fibroids: 15.2mm x 14.7mm x 16.6mm anterior & 20.8mm x 27.5mm x 17mm posterior.  Discussed medical and surgical options. Patient desires to try progesterone only pill for now. Sample of slynd given to patient to start and rx generic. Risks/benefits explained.  Follow up 2-3 months with repeat ultrasound vaginally and to see physician to determine next steps.      Jennifer Hagen, APRN  01/02/2024

## 2024-02-14 DIAGNOSIS — D25.0 INTRAMURAL AND SUBMUCOUS LEIOMYOMA OF UTERUS: ICD-10-CM

## 2024-02-14 DIAGNOSIS — N92.1 MENORRHAGIA WITH IRREGULAR CYCLE: Primary | ICD-10-CM

## 2024-02-14 DIAGNOSIS — D25.9 UTERINE LEIOMYOMA, UNSPECIFIED LOCATION: ICD-10-CM

## 2024-02-14 DIAGNOSIS — D25.1 INTRAMURAL AND SUBMUCOUS LEIOMYOMA OF UTERUS: ICD-10-CM

## 2024-02-15 ENCOUNTER — OFFICE VISIT (OUTPATIENT)
Dept: OBSTETRICS AND GYNECOLOGY | Facility: CLINIC | Age: 39
End: 2024-02-15
Payer: COMMERCIAL

## 2024-02-15 VITALS
DIASTOLIC BLOOD PRESSURE: 78 MMHG | HEIGHT: 65 IN | SYSTOLIC BLOOD PRESSURE: 122 MMHG | WEIGHT: 144 LBS | BODY MASS INDEX: 23.99 KG/M2

## 2024-02-15 DIAGNOSIS — D25.1 INTRAMURAL LEIOMYOMA OF UTERUS: ICD-10-CM

## 2024-02-15 DIAGNOSIS — N92.1 MENORRHAGIA WITH IRREGULAR CYCLE: ICD-10-CM

## 2024-02-15 DIAGNOSIS — N94.6 DYSMENORRHEA: Primary | ICD-10-CM

## 2024-02-15 DIAGNOSIS — Z30.011 ENCOUNTER FOR INITIAL PRESCRIPTION OF CONTRACEPTIVE PILLS: ICD-10-CM

## 2024-02-15 RX ORDER — ACETAMINOPHEN AND CODEINE PHOSPHATE 120; 12 MG/5ML; MG/5ML
1 SOLUTION ORAL DAILY
Qty: 84 TABLET | Refills: 3 | Status: SHIPPED | OUTPATIENT
Start: 2024-02-15 | End: 2024-06-06

## 2024-04-11 ENCOUNTER — OFFICE VISIT (OUTPATIENT)
Dept: OBSTETRICS AND GYNECOLOGY | Facility: CLINIC | Age: 39
End: 2024-04-11
Payer: COMMERCIAL

## 2024-04-11 VITALS
HEIGHT: 65 IN | BODY MASS INDEX: 24.32 KG/M2 | DIASTOLIC BLOOD PRESSURE: 60 MMHG | SYSTOLIC BLOOD PRESSURE: 100 MMHG | WEIGHT: 146 LBS

## 2024-04-11 DIAGNOSIS — Z01.419 ENCOUNTER FOR GYNECOLOGICAL EXAMINATION WITHOUT ABNORMAL FINDING: Primary | ICD-10-CM

## 2024-04-11 NOTE — PROGRESS NOTES
GYN Annual Exam     CC- Here for annual exam.   Subjective   HPI  Luzma Parks is a 39 y.o. female established patient who presents for annual well woman exam. Her periods are irregular, lasting 6 days and are moderate and clots are absent.  She reports mild, moderate dysmenorrhea.  Partner Status: Marital Status: .   The patient is not happy with her current contraceptive method.      The patient has any complaints today. The patient was started on [place] OCP in January for ovarian cysts. She complains of a full period on the second week of her pill pack and no period the week of placebos, but she still gets dysmenorrhea. She has noticed dry mouth and mod changes. She uses vasectomy for birth control and wants to discuss another OCP.    She exercises regularly: yes.  She has concerns about domestic violence: no.    OB History          3    Para   2    Term   2            AB   1    Living   2         SAB   1    IAB        Ectopic        Molar        Multiple   0    Live Births   2                  Current contraception: OCP (estrogen/progesterone)  History of abnormal Pap smear: no  Family history of uterine, colon or ovarian cancer: no  Family history of breast cancer: no  H/o STDs: no  History of abnormal mammogram: no  Last pap:2021 negative -HPV  Thromboembolic Disease: none    Health Maintenance   Topic Date Due    TDAP/TD VACCINES (1 - Tdap) Never done    HEPATITIS C SCREENING  Never done    ANNUAL PHYSICAL  Never done    Annual Gynecologic Pelvic and Breast Exam  2022    COVID-19 Vaccine (2023-24 season) 2023    PAP SMEAR  2024    INFLUENZA VACCINE  2024    Pneumococcal Vaccine 0-64  Aged Out       The following portions of the patient's history were reviewed and updated as appropriate: allergies, current medications, past family history, past medical history, past social history, past surgical history, and problem list.    Review of Systems  Review  "of Systems   Genitourinary:  Positive for menstrual problem.   All other systems reviewed and are negative.        I have reviewed and agree with the HPI, ROS, and historical information as entered above. Elizabeth Condon MD    Objective   /60   Ht 165.1 cm (65\")   Wt 66.2 kg (146 lb)   LMP 04/01/2024 (Exact Date)   BMI 24.30 kg/m²     Physical Exam  Vitals and nursing note reviewed. Exam conducted with a chaperone present.   Constitutional:       General: She is awake.   HENT:      Head: Normocephalic and atraumatic.   Neck:      Thyroid: No thyroid mass, thyromegaly or thyroid tenderness.   Cardiovascular:      Rate and Rhythm: Normal rate.      Heart sounds: No murmur heard.     No friction rub. No gallop.   Pulmonary:      Effort: Pulmonary effort is normal.      Breath sounds: Normal breath sounds. No stridor. No wheezing, rhonchi or rales.   Chest:      Chest wall: No mass or tenderness.   Breasts:     Right: Normal. No bleeding, inverted nipple, mass, nipple discharge, skin change or tenderness.      Left: Normal. No bleeding, inverted nipple, mass, nipple discharge, skin change or tenderness.   Abdominal:      Palpations: Abdomen is soft.      Tenderness: There is no guarding or rebound.      Hernia: There is no hernia in the left inguinal area or right inguinal area.   Genitourinary:     General: Normal vulva.      Pubic Area: No rash.       Labia:         Right: No rash, tenderness, lesion or injury.         Left: No rash, tenderness, lesion or injury.       Urethra: No prolapse, urethral swelling or urethral lesion.      Vagina: No foreign body. No vaginal discharge, erythema, tenderness, bleeding or lesions.      Cervix: No cervical motion tenderness, discharge, friability, lesion, erythema or cervical bleeding.      Uterus: Normal. Not deviated, not enlarged, not fixed and not tender.       Adnexa: Right adnexa normal and left adnexa normal.        Right: No mass, tenderness or fullness.         "  Left: No mass, tenderness or fullness.        Rectum: No external hemorrhoid.   Musculoskeletal:      Cervical back: Normal range of motion.   Lymphadenopathy:      Upper Body:      Right upper body: No supraclavicular or axillary adenopathy.      Left upper body: No supraclavicular or axillary adenopathy.   Skin:     General: Skin is warm.   Neurological:      Mental Status: She is alert and oriented to person, place, and time.   Psychiatric:         Mood and Affect: Mood and affect normal.         Speech: Speech normal.          Assessment   Assessment  Problem List Items Addressed This Visit    None  Visit Diagnoses       Encounter for gynecological examination without abnormal finding    -  Primary    Relevant Orders    LIQUID-BASED PAP SMEAR WITH HPV GENOTYPING REGARDLESS OF INTERPRETATION (ANITA,COR,MAD)            Plan    1) GYN Health Maintenance: pap/HPV  Self Breast Exams demonstrated and encouraged.  2) Follow up prn or 1 year  3) Contraception: vasectomy  4)    No further severe pain, would like to stop POP for now.  Call with any issues.      Elizabeth Condon MD  04/11/2024

## 2024-04-15 LAB — REF LAB TEST METHOD: NORMAL

## 2024-06-11 ENCOUNTER — OFFICE VISIT (OUTPATIENT)
Dept: OBSTETRICS AND GYNECOLOGY | Facility: CLINIC | Age: 39
End: 2024-06-11
Payer: COMMERCIAL

## 2024-06-11 VITALS
HEIGHT: 65 IN | DIASTOLIC BLOOD PRESSURE: 66 MMHG | WEIGHT: 144.4 LBS | BODY MASS INDEX: 24.06 KG/M2 | SYSTOLIC BLOOD PRESSURE: 102 MMHG

## 2024-06-11 DIAGNOSIS — N90.7 LABIAL CYST: Primary | ICD-10-CM

## 2024-06-11 PROCEDURE — 99213 OFFICE O/P EST LOW 20 MIN: CPT

## 2024-06-11 NOTE — PROGRESS NOTES
"         Chief Complaint   Patient presents with    Follow-up     Vulvar Issues         Subjective   HPI  Luzma Parks is a 39 y.o. female, , who presents for evaluation of vulvar lump that is initial.      She states she has experienced this problem for 2 weeks.  Since she first discovered the issue it has worsened . She states that the \"pea\" sized bump is on her left labia.  She describes the severity as localized and throbbing.  Patient notes aggravating factors include walking, sitting, sleeping on her side and alleviating factors are hot water/ heating pad.  The patient reports additional symptoms as none.  The patient does not have a history of a bartholin's cyst.    Her last LMP was Patient's last menstrual period was 2024 (approximate)..  Partner Status: Marital Status: .  New Partners since last visit: no.  Desires STD Screening: no. Her STD history is significant for:  none.     Additional OB/GYN History   Last Pap : 2024-Negative HPV POOL: Negative   Last Completed Pap Smear            PAP SMEAR (Every 3 Years) Next due on 2024  LIQUID-BASED PAP SMEAR WITH HPV GENOTYPING REGARDLESS OF INTERPRETATION ([facility])    2021  SCANNED - PAP SMEAR    2019  Done - neg                    Tobacco Usage?: No     No current outpatient medications on file.     Past Medical History:   Diagnosis Date    Breast pain 2020    L breast    Eczema     Fractured hand     History of miscarriage     Ovarian cyst     h/o     PMS (premenstrual syndrome)     Uterine leiomyoma 2024        Past Surgical History:   Procedure Laterality Date    WISDOM TOOTH EXTRACTION         The additional following portions of the patient's history were reviewed and updated as appropriate: allergies and current medications.    Review of Systems   Genitourinary:  Positive for genital sores and vaginal pain.   All other systems reviewed and are negative.      I have reviewed and " "agree with the HPI, ROS, and historical information as entered above. Jennifer Hagen, APRN      Objective   /66 (BP Location: Right arm, Patient Position: Sitting, Cuff Size: Adult)   Ht 165.1 cm (65\")   Wt 65.5 kg (144 lb 6.4 oz)   LMP 05/22/2024 (Approximate)   BMI 24.03 kg/m²     Physical Exam  Vitals and nursing note reviewed. Exam conducted with a chaperone present.   Constitutional:       General: She is not in acute distress.     Appearance: Normal appearance. She is not ill-appearing, toxic-appearing or diaphoretic.   Pulmonary:      Effort: Pulmonary effort is normal. No respiratory distress.   Genitourinary:     General: Normal vulva.      Exam position: Lithotomy position.      Labia:         Left: Lesion present.           Comments: <1cm size occlusion cyst on posterior side labial minora  Neurological:      Mental Status: She is oriented to person, place, and time.   Psychiatric:         Mood and Affect: Mood normal.         Behavior: Behavior normal.         Thought Content: Thought content normal.         Judgment: Judgment normal.         Assessment & Plan     Assessment         Problem List Items Addressed This Visit    None  Visit Diagnoses       Labial cyst    -  Primary              Plan     Established gyn problem visit: painful bump on left labia minora  <1cm size occlusion cyst on posterior side labial minora-occlusion cyst  Encouraged sitz bath 2x day  Motrin PRN  Loose fit clothing, no underwear until cysts resolves  Follow up PRN worsening symptoms      Jennifer Hagen, APRN  06/11/2024   "

## 2024-08-29 ENCOUNTER — TELEPHONE (OUTPATIENT)
Dept: OBSTETRICS AND GYNECOLOGY | Facility: CLINIC | Age: 39
End: 2024-08-29
Payer: COMMERCIAL

## 2024-08-29 NOTE — TELEPHONE ENCOUNTER
Caller: Luzma Parks    Relationship to patient: Self    Best call back number:[phone]    Chief complaint: PT ADV FOR PAST 2 WKS HAS HAD POSTCOITAL BLEEDING, LIGHT BLEEDING,     Type of visit: U/S AND VISIT - NA AT OFFICE    Requested date:  ANY DAY EXCEPT WEDNESDAY    If rescheduling, when is the original appointment:      Additional notes:

## 2024-09-05 ENCOUNTER — OFFICE VISIT (OUTPATIENT)
Dept: GASTROENTEROLOGY | Facility: CLINIC | Age: 39
End: 2024-09-05
Payer: COMMERCIAL

## 2024-09-05 VITALS
TEMPERATURE: 97.1 F | WEIGHT: 143 LBS | BODY MASS INDEX: 23.82 KG/M2 | HEIGHT: 65 IN | DIASTOLIC BLOOD PRESSURE: 67 MMHG | SYSTOLIC BLOOD PRESSURE: 115 MMHG | HEART RATE: 75 BPM

## 2024-09-05 DIAGNOSIS — K62.5 BRBPR (BRIGHT RED BLOOD PER RECTUM): Primary | ICD-10-CM

## 2024-09-05 DIAGNOSIS — K59.04 CHRONIC IDIOPATHIC CONSTIPATION: ICD-10-CM

## 2024-09-05 DIAGNOSIS — R10.30 LOWER ABDOMINAL PAIN: ICD-10-CM

## 2024-09-05 PROCEDURE — 99213 OFFICE O/P EST LOW 20 MIN: CPT

## 2024-09-05 RX ORDER — SODIUM, POTASSIUM,MAG SULFATES 17.5-3.13G
SOLUTION, RECONSTITUTED, ORAL ORAL
Qty: 354 ML | Refills: 0 | Status: SHIPPED | OUTPATIENT
Start: 2024-09-05

## 2024-09-05 NOTE — PATIENT INSTRUCTIONS
Recommend bowel cleanse with Miralax, which is over the counter. Dissolve 6 capfuls into 32 ounces of sugar-free gatorade/powerade and drink 8 ounces every 30 minutes until gone. Follow this with regular use of Miralax, titrating to achieve/maintain soft/formed bowel movements. Miralax is safe to use long-term. You can also repeat the bowel cleanse as needed.   Recommend 25-30 grams of fiber daily. May use Fibercon tablets to supplement as needed. Fibercon is less likely to cause gas/bloating.   Drink 64-80 ounces of water daily, with ideally half of that containing electrolytes (sugar free gatorade/powerade, electrolyte tablets)  Exercise is helpful for maintaining healthy bowel habits.

## 2024-09-05 NOTE — PROGRESS NOTES
"    Office Note     Name: Luzma Parks    : 1985     MRN: [id number]     Chief Complaint  GI Bleeding    Subjective     History of Present Illness:  Luzma Parks is a 39 y.o. female who presents today for evaluation of primarily right sided/pelvic abdominal pain, with history of chronic left-sided pain as well, that has been worked up extensively with findings of uterine fibroids and cysts.  She is following back up with her OB/GYN later this month.  She reports that the right sided pain is worse with activity and is also tender to palpation.  She denies any nausea, vomiting, fever, but does endorse random \"waves\" of nausea after eating, that she has had for a long time.  She has a remote history of GERD, but denies consistent issues now.  She uses Tums as needed.    She has prior abdominal ultrasound  that showed probable left inguinal hernia, but CT scans have been unremarkable.  She has had a couple of episodes of rectal bleeding in the last couple of weeks, with known hemorrhoids since she had her child in 2019, but this is the first time she has had rectal bleeding.     Right sided pain worse with activity, denies post prandial GI symptoms.  She does not have a bowel movement every day, and often has stools 1-3 on the BSC. She has autoimmune diseases in the family, but denies any family history of Crohn's or ulcerative colitis, or GI cancers.     She denies any dysphagia, odynophagia, vomiting, early satiety, unintentional weight loss.    Past Medical History:   Past Medical History:   Diagnosis Date    Breast pain 2020    L breast    Eczema     Fractured hand     History of miscarriage     Ovarian cyst     h/o     PMS (premenstrual syndrome)     Uterine leiomyoma 2024       Past Surgical History:   Past Surgical History:   Procedure Laterality Date    WISDOM TOOTH EXTRACTION         Immunizations:   Immunization History   Administered Date(s) Administered    COVID-19 " "(MODERNA) 1st,2nd,3rd Dose Monovalent 01/23/2021, 02/27/2021, 11/20/2021    Influenza, Unspecified 10/01/2021        Medications:   No current outpatient medications on file.    Allergies:   Allergies   Allergen Reactions    Penicillins Rash       Family History:   Family History   Problem Relation Age of Onset    Rheum arthritis Mother     Atrial fibrillation Mother     Diabetes Mother     Dementia Paternal Grandmother     Prostate cancer Father     Breast cancer Neg Hx     Ovarian cancer Neg Hx     Uterine cancer Neg Hx     Colon cancer Neg Hx        Social History:   Social History     Socioeconomic History    Marital status:      Spouse name: Sujit Parks     Number of children: 2    Years of education: college   Tobacco Use    Smoking status: Never    Smokeless tobacco: Never   Vaping Use    Vaping status: Never Used   Substance and Sexual Activity    Alcohol use: Never    Drug use: No    Sexual activity: Yes     Partners: Male     Birth control/protection: Vasectomy         Objective     Vital Signs  /67 (BP Location: Right arm, Patient Position: Sitting, Cuff Size: Adult)   Pulse 75   Temp 97.1 °F (36.2 °C) (Temporal)   Ht 165.1 cm (65\")   Wt 64.9 kg (143 lb)   BMI 23.80 kg/m²   Estimated body mass index is 23.8 kg/m² as calculated from the following:    Height as of this encounter: 165.1 cm (65\").    Weight as of this encounter: 64.9 kg (143 lb).    BMI is within normal parameters. No other follow-up for BMI required.      Physical Exam  Vitals reviewed.   Constitutional:       General: She is awake.   Cardiovascular:      Rate and Rhythm: Normal rate.   Pulmonary:      Effort: Pulmonary effort is normal.   Abdominal:      Palpations: Abdomen is soft.      Tenderness: There is no abdominal tenderness.   Skin:     General: Skin is warm and dry.   Neurological:      Mental Status: She is alert.          Assessment and Plan     Assessment & Plan  Chronic idiopathic constipation  Suspect her " abdominal pain may be related to constipation.  Recommended MiraLAX bowel cleanse followed by daily MiraLAX if needed.  She also has hemorrhoids, which may be improved with softer stools.    BRBPR (bright red blood per rectum)  Likely related to hemorrhoids, but given her new abdominal pain and the fact that the bleeding is new for her, despite long history of hemorrhoids, would recommend colonoscopy for further evaluation.  Recommend that she complete an additional MiraLAX bowel cleanse the day before her bowel prep, or initiate MiraLAX daily 2 weeks prior to colonoscopy, given long history of constipation.    Lower abdominal pain  Possibly related to her constipation.  Will plan for colonoscopy for further evaluation, but will go ahead and get CT scan to rule out appendicitis, and evaluate for diverticulitis.               Follow Up  After colonoscopy    LAYA Teague  [facility] [id number]  [facility]  [address] [id number]

## 2024-09-13 ENCOUNTER — OUTSIDE FACILITY SERVICE (OUTPATIENT)
Dept: GASTROENTEROLOGY | Facility: CLINIC | Age: 39
End: 2024-09-13
Payer: COMMERCIAL

## 2024-09-13 PROCEDURE — 88305 TISSUE EXAM BY PATHOLOGIST: CPT | Performed by: INTERNAL MEDICINE

## 2024-09-13 PROCEDURE — 45385 COLONOSCOPY W/LESION REMOVAL: CPT | Performed by: INTERNAL MEDICINE

## 2024-09-16 ENCOUNTER — LAB REQUISITION (OUTPATIENT)
Dept: LAB | Facility: HOSPITAL | Age: 39
End: 2024-09-16
Payer: COMMERCIAL

## 2024-09-16 DIAGNOSIS — D12.5 BENIGN NEOPLASM OF SIGMOID COLON: ICD-10-CM

## 2024-09-16 DIAGNOSIS — K62.5 HEMORRHAGE OF ANUS AND RECTUM: ICD-10-CM

## 2024-09-16 DIAGNOSIS — K59.00 CONSTIPATION, UNSPECIFIED: ICD-10-CM

## 2024-09-17 LAB
CYTO UR: NORMAL
LAB AP CASE REPORT: NORMAL
LAB AP CLINICAL INFORMATION: NORMAL
PATH REPORT.FINAL DX SPEC: NORMAL
PATH REPORT.GROSS SPEC: NORMAL

## 2024-09-30 ENCOUNTER — HOSPITAL ENCOUNTER (OUTPATIENT)
Dept: CT IMAGING | Facility: HOSPITAL | Age: 39
Discharge: HOME | End: 2024-09-30
Payer: COMMERCIAL

## 2024-09-30 DIAGNOSIS — R10.30 LOWER ABDOMINAL PAIN: ICD-10-CM

## 2024-09-30 PROCEDURE — 74178 CT ABD&PLV WO CNTR FLWD CNTR: CPT

## 2024-09-30 PROCEDURE — 25510000001 IOPAMIDOL 61 % SOLUTION

## 2024-09-30 RX ORDER — IOPAMIDOL 612 MG/ML
100 INJECTION, SOLUTION INTRAVASCULAR
Status: COMPLETED | OUTPATIENT
Start: 2024-09-30 | End: 2024-09-30

## 2024-09-30 RX ADMIN — IOPAMIDOL 75 ML: 612 INJECTION, SOLUTION INTRAVENOUS at 15:32

## 2024-11-13 DIAGNOSIS — D25.0 INTRAMURAL AND SUBMUCOUS LEIOMYOMA OF UTERUS: Primary | ICD-10-CM

## 2024-11-13 DIAGNOSIS — D25.1 INTRAMURAL AND SUBMUCOUS LEIOMYOMA OF UTERUS: Primary | ICD-10-CM

## 2024-11-13 DIAGNOSIS — N93.9 ABNORMAL UTERINE BLEEDING (AUB): ICD-10-CM

## 2024-11-14 ENCOUNTER — OFFICE VISIT (OUTPATIENT)
Dept: OBSTETRICS AND GYNECOLOGY | Facility: CLINIC | Age: 39
End: 2024-11-14
Payer: COMMERCIAL

## 2024-11-14 VITALS
DIASTOLIC BLOOD PRESSURE: 70 MMHG | SYSTOLIC BLOOD PRESSURE: 108 MMHG | HEIGHT: 65 IN | WEIGHT: 142 LBS | BODY MASS INDEX: 23.66 KG/M2

## 2024-11-14 DIAGNOSIS — N92.0 MENORRHAGIA WITH REGULAR CYCLE: ICD-10-CM

## 2024-11-14 DIAGNOSIS — D25.1 INTRAMURAL LEIOMYOMA OF UTERUS: Primary | ICD-10-CM

## 2024-11-14 DIAGNOSIS — N94.6 DYSMENORRHEA: ICD-10-CM

## 2024-11-14 RX ORDER — IBUPROFEN 800 MG/1
800 TABLET, FILM COATED ORAL EVERY 8 HOURS PRN
Qty: 30 TABLET | Refills: 2 | Status: SHIPPED | OUTPATIENT
Start: 2024-11-14

## 2024-11-14 NOTE — PROGRESS NOTES
Gynecologic Exam Note      Chief Complaint   Patient presents with    Follow-up       CC:  AUB    Subjective   HPI  Luzma Parks is a 39 y.o. female, , who presents with recurrent evaluation of Abnormal Uterine Bleeding.      She states she has experienced this problem for several duration: years.  She describes the severity as moderate.  She states that the problem is annoying and.  The patient uses 1 of tampons/pads per hour.  The patient reports additional symptoms as pelvic pain and dyspareunia .  The patient has  been evaluated:   with US, she has hx of uterine fibroids and ovarian cysts .  In the past the patient has tried birth control pills/Nuvaring. Pt does not wish to start OCP or have IUD due to  having vasectomy.     Pt reports that in August she had PCB, dyspareunia, RLQ pain, rectal bleeding and right hip pain.  She had colonoscopy and had appendix evaluated and per the pt this was all normal.  The PCB has resolved.  She still has some discomfort with IC, but no severe pain.      Her last LMP was Patient's last menstrual period was 10/14/2024 (approximate).  Periods are  regular every 28-30 days , lasting 5 days.  Partner Status: Marital Status: . She also reports some months will get 2 periods. She is saturating pad/tampon every hour for 2 days of menses.     Additional OB/GYN History   Last Pap :   Last Completed Pap Smear            PAP SMEAR (Every 3 Years) Next due on 2024  LIQUID-BASED PAP SMEAR WITH HPV GENOTYPING REGARDLESS OF INTERPRETATION ([facility]ITA,[facility])    2021  SCANNED - PAP SMEAR    2019  Done - neg                  History of abnormal Pap smear: no  Tobacco Usage?: No     The additional following portions of the patient's history were reviewed and updated as appropriate: allergies, current medications, past family history, past medical history, past social history, past surgical history, and problem list.    Review of  "Systems   Constitutional: Negative.    HENT: Negative.     Eyes: Negative.    Respiratory: Negative.     Cardiovascular: Negative.    Endocrine: Negative.    Genitourinary:  Positive for dyspareunia, menstrual problem and pelvic pain.   Musculoskeletal: Negative.    Skin: Negative.    Allergic/Immunologic: Negative.    Neurological: Negative.    Hematological: Negative.    Psychiatric/Behavioral: Negative.         I have reviewed and agree with the HPI, ROS, and historical information as entered above. Elizabeth Condon MD    Objective   /70 (BP Location: Right arm)   Ht 165.1 cm (65\")   Wt 64.4 kg (142 lb)   LMP 10/14/2024 (Approximate)   BMI 23.63 kg/m²     Physical Exam  Vitals and nursing note reviewed.   Constitutional:       General: She is not in acute distress.     Appearance: Normal appearance.   HENT:      Head: Normocephalic and atraumatic.   Pulmonary:      Effort: Pulmonary effort is normal. No accessory muscle usage or respiratory distress.   Neurological:      Mental Status: She is alert and oriented to person, place, and time.   Psychiatric:         Mood and Affect: Mood and affect normal.         Speech: Speech normal.         Assessment & Plan     Assessment     Problem List Items Addressed This Visit       Dysmenorrhea    Relevant Medications    ibuprofen (ADVIL,MOTRIN) 800 MG tablet    Uterine leiomyoma - Primary    Overview     1/2/24 Uterine fibroids: 15.2mm x 14.7mm x 16.6mm anterior & 20.8mm x 27.5mm x 17mm posterior.          Other Visit Diagnoses       Menorrhagia with regular cycle        Relevant Medications    ibuprofen (ADVIL,MOTRIN) 800 MG tablet              Plan     Follow Up: Return for Annual physical.  PT with an episode of PCB in August that has resolved.  She also had some dyspareunia and pelvic pain at this time.  These symptoms have resolved.  She will have some discomfort with intercourse at times, but no severe pain.  Periods regular, but heavy with dysmen.  No change " in these.  TVUS today shows stable small fibroid, normal ovaries.  She declines hormonal treatments for now.  Will try premed with nsaids.  Call prn.        Elizabeth Condon MD  11/14/2024

## 2025-04-24 ENCOUNTER — OFFICE VISIT (OUTPATIENT)
Dept: OBSTETRICS AND GYNECOLOGY | Facility: CLINIC | Age: 40
End: 2025-04-24
Payer: COMMERCIAL

## 2025-04-24 VITALS
SYSTOLIC BLOOD PRESSURE: 100 MMHG | BODY MASS INDEX: 24.66 KG/M2 | HEIGHT: 65 IN | DIASTOLIC BLOOD PRESSURE: 62 MMHG | WEIGHT: 148 LBS

## 2025-04-24 DIAGNOSIS — Z12.31 BREAST CANCER SCREENING BY MAMMOGRAM: ICD-10-CM

## 2025-04-24 DIAGNOSIS — N92.0 MENORRHAGIA WITH REGULAR CYCLE: ICD-10-CM

## 2025-04-24 DIAGNOSIS — Z01.411 ENCOUNTER FOR GYNECOLOGICAL EXAMINATION WITH ABNORMAL FINDING: Primary | ICD-10-CM

## 2025-04-24 DIAGNOSIS — D25.9 UTERINE LEIOMYOMA, UNSPECIFIED LOCATION: ICD-10-CM

## 2025-04-24 RX ORDER — TRANEXAMIC ACID 650 MG/1
TABLET ORAL
Qty: 30 TABLET | Refills: 12 | Status: SHIPPED | OUTPATIENT
Start: 2025-04-24

## 2025-04-24 NOTE — PROGRESS NOTES
Gynecologic Annual Exam Note          CC - Here for annual exam.     Subjective   HPI  Luzma Parks is a 40 y.o. female, , who presents for annual well woman exam. Patient's last menstrual period was 2025 (exact date)..  Periods are regular and last 5-6 days, the flow is heavy.  This is stable, has always had heavy menses.   Dysmenorrhea:moderate, occurring premenstrually and first 1-2 days of flow.  Patient reports problems with: none.  Partner Status: Marital Status: .      The patient does not have any complaints today.    She has concerns about domestic violence: no.      Additional OB/GYN History   Current contraception: contraceptive methods: Vasectomy   Desires to: continue contraception  History of abnormal Pap smear: no  Family history of uterine, colon, breast, or ovarian cancer: no  Performs monthly Self-Breast Exam: yes  Exercises Regularly:yes  Feelings of Anxiety or Depression: no  Tobacco Usage?: No     Last Pap : 2024 negative -HPV pt requests pap today  Last Completed Pap Smear            Awaiting Completion       PAP SMEAR (Every 3 Years) Order placed this encounter      2025  Order placed for LIQUID-BASED PAP SMEAR WITH HPV GENOTYPING IF ASCUS ([facility] HOWARD,[facility]) by Elizabeth Condon MD    2024  LIQUID-BASED PAP SMEAR WITH HPV GENOTYPING REGARDLESS OF INTERPRETATION ([facility] HOWARD,[facility])    2021  SCANNED - PAP SMEAR    2019  Done - neg                            OB History          3    Para   2    Term   2            AB   1    Living   2         SAB   1    IAB        Ectopic        Molar        Multiple   0    Live Births   2                Health Maintenance   Topic Date Due    TDAP/TD VACCINES (1 - Tdap) Never done    HEPATITIS C SCREENING  Never done    ANNUAL PHYSICAL  Never done    COVID-19 Vaccine ( season) 2024    Annual Gynecologic Pelvic and Breast Exam  2025    INFLUENZA VACCINE  2025    PAP SMEAR  " 04/11/2027    Pneumococcal Vaccine 0-49  Aged Out       The additional following portions of the patient's history were reviewed and updated as appropriate: allergies, current medications, past family history, past medical history, past social history, past surgical history, and problem list.    Review of Systems   All other systems reviewed and are negative.      Past Medical History:   Diagnosis Date    Breast pain 12/2020    L breast    Eczema     Fractured hand     History of miscarriage     Ovarian cyst     h/o     PMS (premenstrual syndrome)     Uterine leiomyoma 1/2/2024        Past Surgical History:   Procedure Laterality Date    WISDOM TOOTH EXTRACTION          I have reviewed and agree with the HPI, ROS, and historical information as entered above. Elizabeth Condon MD    Objective   /62   Ht 165.1 cm (65\")   Wt 67.1 kg (148 lb)   LMP 04/17/2025 (Exact Date)   BMI 24.63 kg/m²     Physical Exam  Vitals and nursing note reviewed. Exam conducted with a chaperone present.   Constitutional:       General: She is awake.   HENT:      Head: Normocephalic and atraumatic.   Neck:      Thyroid: No thyroid mass, thyromegaly or thyroid tenderness.   Cardiovascular:      Rate and Rhythm: Normal rate.      Heart sounds: No murmur heard.     No friction rub. No gallop.   Pulmonary:      Effort: Pulmonary effort is normal.      Breath sounds: Normal breath sounds. No stridor. No wheezing, rhonchi or rales.   Chest:      Chest wall: No mass or tenderness.   Breasts:     Right: Normal. No bleeding, inverted nipple, mass, nipple discharge, skin change or tenderness.      Left: Normal. No bleeding, inverted nipple, mass, nipple discharge, skin change or tenderness.   Abdominal:      Palpations: Abdomen is soft.      Tenderness: There is no guarding or rebound.      Hernia: There is no hernia in the left inguinal area or right inguinal area.   Genitourinary:     General: Normal vulva.      Pubic Area: No rash.       " Labia:         Right: No rash, tenderness, lesion or injury.         Left: No rash, tenderness, lesion or injury.       Urethra: No prolapse, urethral swelling or urethral lesion.      Vagina: No foreign body. No vaginal discharge, erythema, tenderness, bleeding or lesions.      Cervix: No cervical motion tenderness, discharge, friability, lesion, erythema or cervical bleeding.      Uterus: Normal. Not deviated, not enlarged, not fixed and not tender.       Adnexa: Right adnexa normal and left adnexa normal.        Right: No mass, tenderness or fullness.          Left: No mass, tenderness or fullness.        Rectum: No external hemorrhoid.   Musculoskeletal:      Cervical back: Normal range of motion.   Lymphadenopathy:      Upper Body:      Right upper body: No supraclavicular or axillary adenopathy.      Left upper body: No supraclavicular or axillary adenopathy.   Skin:     General: Skin is warm.   Neurological:      Mental Status: She is alert and oriented to person, place, and time.   Psychiatric:         Mood and Affect: Mood and affect normal.         Speech: Speech normal.         Assessment & Plan     Assessment     Problem List Items Addressed This Visit       Uterine leiomyoma    Overview   1/2/24 Uterine fibroids: 15.2mm x 14.7mm x 16.6mm anterior & 20.8mm x 27.5mm x 17mm posterior.          Other Visit Diagnoses         Encounter for gynecological examination with abnormal finding    -  Primary    Relevant Orders    LIQUID-BASED PAP SMEAR WITH HPV GENOTYPING IF ASCUS ([facility]ITA,[facility])      Menorrhagia with regular cycle        Relevant Medications    Tranexamic Acid (Lysteda) 650 MG tablet      Breast cancer screening by mammogram        Relevant Orders    Mammo Screening Digital Tomosynthesis Bilateral With CAD            Plan     Reviewed monthly self breast exams.  Instructed to call with lumps, pain, or breast discharge.    RTC in 1 year or PRN with problems  Still with stable heavy menses, now regular.   Desires to avoid hormones if possible.  Will try course of Lysteda, potential risks for thromboembolic complications d/w pt today.    To begin annual mammograms at age 41      Elizabeth Condon MD  04/24/2025

## 2025-04-25 LAB — REF LAB TEST METHOD: NORMAL
